# Patient Record
Sex: FEMALE | Race: WHITE | NOT HISPANIC OR LATINO | Employment: UNEMPLOYED | URBAN - METROPOLITAN AREA
[De-identification: names, ages, dates, MRNs, and addresses within clinical notes are randomized per-mention and may not be internally consistent; named-entity substitution may affect disease eponyms.]

---

## 2017-03-22 ENCOUNTER — HOSPITAL ENCOUNTER (OUTPATIENT)
Dept: GENERAL RADIOLOGY | Facility: HOSPITAL | Age: 47
Discharge: HOME | End: 2017-03-22
Attending: FAMILY MEDICINE | Admitting: FAMILY MEDICINE

## 2017-04-12 ENCOUNTER — HOSPITAL ENCOUNTER (OUTPATIENT)
Dept: CARDIOLOGY | Facility: HOSPITAL | Age: 47
Discharge: HOME | End: 2017-04-12
Attending: INTERNAL MEDICINE | Admitting: INTERNAL MEDICINE

## 2017-09-01 ENCOUNTER — OFFICE VISIT (OUTPATIENT)
Dept: CARDIOLOGY | Facility: CLINIC | Age: 47
End: 2017-09-01

## 2017-09-01 VITALS
WEIGHT: 211 LBS | BODY MASS INDEX: 31.25 KG/M2 | HEIGHT: 69 IN | HEART RATE: 73 BPM | SYSTOLIC BLOOD PRESSURE: 106 MMHG | DIASTOLIC BLOOD PRESSURE: 62 MMHG

## 2017-09-01 DIAGNOSIS — Z92.21 HISTORY OF CHEMOTHERAPY: ICD-10-CM

## 2017-09-01 DIAGNOSIS — R07.2 PRECORDIAL PAIN: ICD-10-CM

## 2017-09-01 DIAGNOSIS — I42.9 CARDIOMYOPATHY (HCC): Primary | ICD-10-CM

## 2017-09-01 PROCEDURE — 99204 OFFICE O/P NEW MOD 45 MIN: CPT | Performed by: INTERNAL MEDICINE

## 2017-09-01 PROCEDURE — 93000 ELECTROCARDIOGRAM COMPLETE: CPT | Performed by: INTERNAL MEDICINE

## 2017-09-01 RX ORDER — LANOLIN ALCOHOL/MO/W.PET/CERES
400 CREAM (GRAM) TOPICAL DAILY
COMMUNITY
End: 2021-07-21

## 2017-09-01 NOTE — PROGRESS NOTES
Date of Office Visit: 2017  Encounter Provider: Monty Frank MD  Place of Service: [facility] CARDIOLOGY  Patient Name: Janice Kauffman  :1970    Chief Complaint   Patient presents with   • Chest Pain   :     HPI: Janice Kauffman is a 46 y.o. female who presents today to establish care. She has previously been evaluated by two other cardiologists, once in  and again earlier this year.    In  she was diagnosed with mild nonischemic cardiopathy.  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  Her ejection fraction was initially 40-45%.  She had a treadmill stress test performed in 2013 that was completely normal.    Echo cardiac gram checked in April of this year showed an ejection fraction of 50-55% and was otherwise unremarkable.  A stress test have been recommended that at that time but the patient declined to have this performed.    She has had intermittent chest pain for several years.  She notes it when she wakes up in the morning.  She feels and upper chest pressure and mild shortness breath.  She then sits up and it resolves.  She does not have exertional dyspnea or exertional chest discomfort.  She has rare palpitations which are isolated and occurred at rest.  She does not have orthopnea, PND, edema, lightheadedness, or syncope.  Of note she has ten children.      Past Medical History:   Diagnosis Date   • Asthma     seasonal   • Elevated liver enzymes    • Hodgkin's lymphoma    • Hyperlipidemia    • Mastitis    • Nonischemic cardiomyopathy     unclear if related to chemotherapy or PPCM   • Prediabetes    • UTI (urinary tract infection)     as child       Past Surgical History:   Procedure Laterality Date   •  SECTION      X3   • OTHER SURGICAL HISTORY      PFT   2016   • PORTACATH PLACEMENT         Social History  "    Social History   • Marital status:      Spouse name: N/A   • Number of children: N/A   • Years of education: N/A     Occupational History   • Not on file.     Social History Main Topics   • Smoking status: Never Smoker   • Smokeless tobacco: Never Used   • Alcohol use No      Comment: 2 caffeine drinks per day   • Drug use: No   • Sexual activity: Not on file     Other Topics Concern   • Not on file     Social History Narrative       Family History   Problem Relation Age of Onset   • Heart disease Maternal Grandmother    • Heart disease Other    • Hypertension Mother        Review of Systems   Constitution: Positive for malaise/fatigue.   HENT: Positive for hearing loss.    Eyes: Positive for vision loss in left eye and vision loss in right eye.   Skin: Positive for unusual hair distribution.   Gastrointestinal: Positive for nausea.   All other systems reviewed and are negative.      Allergies   Allergen Reactions   • Latex    • Levaquin [Levofloxacin In D5w]      critical   • Quinolones      mild   • Sulfa Antibiotics      critical         Current Outpatient Prescriptions:   •  B Complex Vitamins (VITAMIN B COMPLEX) tablet, Take 1 tablet by mouth Daily., Disp: , Rfl:   •  Flaxseed, Linseed, (FLAX SEED OIL) 1000 MG capsule, Take 1 capsule by mouth Daily., Disp: , Rfl:   •  folic acid (FOLVITE) 400 MCG tablet, Take 400 mcg by mouth Daily., Disp: , Rfl:   •  Omega-3 Fatty Acids (FISH OIL) 1000 MG capsule capsule, Take 1,000 mg by mouth Daily With Breakfast., Disp: , Rfl:   •  Prenatal Vit-Fe Fumarate-FA (PRENATAL PO), Take 1 tablet by mouth Daily., Disp: , Rfl:   •  pyridoxine (CVS VITAMIN B-6) 100 MG tablet, Take 100 mg by mouth Daily., Disp: , Rfl:      Objective:     Vitals:    09/01/17 1122   BP: 106/62   BP Location: Left arm   Pulse: 73   Weight: 211 lb (95.7 kg)   Height: 68.5\" (174 cm)     Body mass index is 31.62 kg/(m^2).    Physical Exam   Constitutional: She is oriented to person, place, and " time. She appears well-developed and well-nourished.   HENT:   Head: Normocephalic.   Nose: Nose normal.   Mouth/Throat: Oropharynx is clear and moist.   Eyes: Conjunctivae and EOM are normal. Pupils are equal, round, and reactive to light.   Neck: Normal range of motion. No JVD present.   Cardiovascular: Normal rate, regular rhythm and intact distal pulses.    Murmur (soft respirophasic murmur, RUSB) heard.  Pulmonary/Chest: Effort normal and breath sounds normal.   Abdominal: Soft. She exhibits no mass. There is no tenderness.   Musculoskeletal: Normal range of motion. She exhibits no edema.   Lymphadenopathy:     She has no cervical adenopathy.   Neurological: She is alert and oriented to person, place, and time. No cranial nerve deficit.   Skin: Skin is warm and dry. No rash noted.   Psychiatric: She has a normal mood and affect. Her behavior is normal. Judgment and thought content normal.   Vitals reviewed.        ECG 12 Lead  Date/Time: 9/1/2017 1:00 PM  Performed by: WILLI BURCIAGA  Authorized by: WILLI BURCIAGA   Comparison: compared with previous ECG   Similar to previous ECG  Rhythm: sinus rhythm  Conduction: conduction normal  ST Segments: ST segments normal  T Waves: T waves normal  QRS axis: normal  Other: no other findings  Clinical impression: normal ECG              Assessment:       Diagnosis Plan   1. Cardiomyopathy  Adult Stress Echo With Color & Doppler   2. Precordial pain  Adult Stress Echo With Color & Doppler   3. History of chemotherapy  Adult Stress Echo With Color & Doppler          Plan:       In 2013, she was diagnosed with mild LV systolic dysfunction.  This may have been postpartum cardio myopathy, or it may have been related to her previous chemotherapy.  Her initial echo showed an EF of 40-45%, and it had improved to 53% in 2017.  It was otherwise normal.  She states that she was on some type of cardiac medication for short while but that it gave her bad side effects so she elected not  to take it.  She does not have exertional symptoms, and I suspect that the symptoms that she has in the morning while lying flat or noncardiac.  That being said they've been happening for a very long time and it is very worrisome to her.  I am going to check a stress echocardiogram for further evaluation.  If that's normal, I would recommend an esophageal evaluation.    Her increased with palpitations sound like benign ectopics.  I don't feel the need further workup at this time.    Sincerely,       Monty Frank MD

## 2017-09-11 ENCOUNTER — HOSPITAL ENCOUNTER (OUTPATIENT)
Dept: CARDIOLOGY | Facility: HOSPITAL | Age: 47
Discharge: HOME | End: 2017-09-11
Attending: INTERNAL MEDICINE | Admitting: INTERNAL MEDICINE

## 2017-09-11 VITALS
HEIGHT: 69 IN | SYSTOLIC BLOOD PRESSURE: 124 MMHG | DIASTOLIC BLOOD PRESSURE: 76 MMHG | BODY MASS INDEX: 31.25 KG/M2 | WEIGHT: 211 LBS | HEART RATE: 76 BPM

## 2017-09-11 DIAGNOSIS — I42.9 CARDIOMYOPATHY (HCC): ICD-10-CM

## 2017-09-11 DIAGNOSIS — R07.2 PRECORDIAL PAIN: ICD-10-CM

## 2017-09-11 DIAGNOSIS — Z92.21 HISTORY OF CHEMOTHERAPY: ICD-10-CM

## 2017-09-11 LAB
BH CV ECHO MEAS - ACS: 1.7 CM
BH CV ECHO MEAS - AO MAX PG: 10.3 MMHG
BH CV ECHO MEAS - AO ROOT AREA (BSA CORRECTED): 1.3
BH CV ECHO MEAS - AO ROOT AREA: 5.7 CM^2
BH CV ECHO MEAS - AO ROOT DIAM: 2.7 CM
BH CV ECHO MEAS - AO V2 MAX: 160.2 CM/SEC
BH CV ECHO MEAS - BSA(HAYCOCK): 2.2 M^2
BH CV ECHO MEAS - BSA: 2.1 M^2
BH CV ECHO MEAS - BZI_BMI: 31.2 KILOGRAMS/M^2
BH CV ECHO MEAS - BZI_METRIC_HEIGHT: 175.3 CM
BH CV ECHO MEAS - BZI_METRIC_WEIGHT: 95.7 KG
BH CV ECHO MEAS - CONTRAST EF (2CH): 58.2 ML/M^2
BH CV ECHO MEAS - CONTRAST EF 4CH: 52 ML/M^2
BH CV ECHO MEAS - EDV(MOD-SP2): 79 ML
BH CV ECHO MEAS - EDV(MOD-SP4): 75 ML
BH CV ECHO MEAS - EDV(TEICH): 118.5 ML
BH CV ECHO MEAS - EF(CUBED): 60.8 %
BH CV ECHO MEAS - EF(MOD-SP2): 58.2 %
BH CV ECHO MEAS - EF(MOD-SP4): 52 %
BH CV ECHO MEAS - EF(TEICH): 52.1 %
BH CV ECHO MEAS - ESV(MOD-SP2): 33 ML
BH CV ECHO MEAS - ESV(MOD-SP4): 36 ML
BH CV ECHO MEAS - ESV(TEICH): 56.7 ML
BH CV ECHO MEAS - FS: 26.8 %
BH CV ECHO MEAS - IVS/LVPW: 1
BH CV ECHO MEAS - IVSD: 1 CM
BH CV ECHO MEAS - LAT PEAK E' VEL: 13 CM/SEC
BH CV ECHO MEAS - LV DIASTOLIC VOL/BSA (35-75): 35.5 ML/M^2
BH CV ECHO MEAS - LV MASS(C)D: 186.4 GRAMS
BH CV ECHO MEAS - LV MASS(C)DI: 88.2 GRAMS/M^2
BH CV ECHO MEAS - LV SYSTOLIC VOL/BSA (12-30): 17 ML/M^2
BH CV ECHO MEAS - LVIDD: 5 CM
BH CV ECHO MEAS - LVIDS: 3.7 CM
BH CV ECHO MEAS - LVLD AP2: 7.5 CM
BH CV ECHO MEAS - LVLD AP4: 7.3 CM
BH CV ECHO MEAS - LVLS AP2: 6.3 CM
BH CV ECHO MEAS - LVLS AP4: 6.4 CM
BH CV ECHO MEAS - LVPWD: 1 CM
BH CV ECHO MEAS - MED PEAK E' VEL: 12 CM/SEC
BH CV ECHO MEAS - MV A DUR: 0.11 SEC
BH CV ECHO MEAS - MV A MAX VEL: 66 CM/SEC
BH CV ECHO MEAS - MV DEC SLOPE: 338.1 CM/SEC^2
BH CV ECHO MEAS - MV DEC TIME: 0.24 SEC
BH CV ECHO MEAS - MV E MAX VEL: 80.5 CM/SEC
BH CV ECHO MEAS - MV E/A: 1.2
BH CV ECHO MEAS - MV P1/2T MAX VEL: 81 CM/SEC
BH CV ECHO MEAS - MV P1/2T: 70.2 MSEC
BH CV ECHO MEAS - MVA P1/2T LCG: 2.7 CM^2
BH CV ECHO MEAS - MVA(P1/2T): 3.1 CM^2
BH CV ECHO MEAS - PULM A REVS DUR: 0.11 SEC
BH CV ECHO MEAS - PULM A REVS VEL: 32 CM/SEC
BH CV ECHO MEAS - PULM DIAS VEL: 31.5 CM/SEC
BH CV ECHO MEAS - PULM S/D: 1.4
BH CV ECHO MEAS - PULM SYS VEL: 44.1 CM/SEC
BH CV ECHO MEAS - SI(CUBED): 36.1 ML/M^2
BH CV ECHO MEAS - SI(MOD-SP2): 21.8 ML/M^2
BH CV ECHO MEAS - SI(MOD-SP4): 18.5 ML/M^2
BH CV ECHO MEAS - SI(TEICH): 29.2 ML/M^2
BH CV ECHO MEAS - SV(CUBED): 76.2 ML
BH CV ECHO MEAS - SV(MOD-SP2): 46 ML
BH CV ECHO MEAS - SV(MOD-SP4): 39 ML
BH CV ECHO MEAS - SV(TEICH): 61.8 ML
BH CV ECHO MEAS - TAPSE (>1.6): 1.9 CM2
BH CV ECHO MEAS - TR MAX VEL: 192.7 CM/SEC
BH CV STRESS BP STAGE 1: NORMAL
BH CV STRESS BP STAGE 2: NORMAL
BH CV STRESS BP STAGE 3: NORMAL
BH CV STRESS DURATION MIN STAGE 1: 3
BH CV STRESS DURATION MIN STAGE 2: 3
BH CV STRESS DURATION MIN STAGE 3: 3
BH CV STRESS DURATION MIN STAGE 4: 1
BH CV STRESS DURATION SEC STAGE 1: 0
BH CV STRESS DURATION SEC STAGE 2: 0
BH CV STRESS DURATION SEC STAGE 3: 0
BH CV STRESS DURATION SEC STAGE 4: 30
BH CV STRESS ECHO POST STRESS EJECTION FRACTION EF: 74 %
BH CV STRESS GRADE STAGE 1: 10
BH CV STRESS GRADE STAGE 2: 12
BH CV STRESS GRADE STAGE 3: 14
BH CV STRESS GRADE STAGE 4: 16
BH CV STRESS HR STAGE 1: 104
BH CV STRESS HR STAGE 2: 119
BH CV STRESS HR STAGE 3: 136
BH CV STRESS HR STAGE 4: 158
BH CV STRESS METS STAGE 1: 5
BH CV STRESS METS STAGE 2: 7.5
BH CV STRESS METS STAGE 3: 10
BH CV STRESS METS STAGE 4: 13.5
BH CV STRESS PROTOCOL 1: NORMAL
BH CV STRESS SPEED STAGE 1: 1.7
BH CV STRESS SPEED STAGE 2: 2.5
BH CV STRESS SPEED STAGE 3: 3.4
BH CV STRESS SPEED STAGE 4: 4.2
BH CV STRESS STAGE 1: 1
BH CV STRESS STAGE 2: 2
BH CV STRESS STAGE 3: 3
BH CV STRESS STAGE 4: 4
BH CV XLRA - RV BASE: 2.7 CM
BH CV XLRA - TDI S': 13 CM/SEC
E/E' RATIO: 6.5
LEFT ATRIUM VOLUME INDEX: 12 ML/M2
MAXIMAL PREDICTED HEART RATE: 175 BPM
PERCENT MAX PREDICTED HR: 90.29 %
STRESS BASELINE BP: NORMAL MMHG
STRESS BASELINE HR: 76 BPM
STRESS PERCENT HR: 106 %
STRESS POST ESTIMATED WORKLOAD: 12 METS
STRESS POST EXERCISE DUR MIN: 10 MIN
STRESS POST EXERCISE DUR SEC: 30 SEC
STRESS POST PEAK BP: NORMAL MMHG
STRESS POST PEAK HR: 158 BPM
STRESS TARGET HR: 149 BPM

## 2017-09-11 PROCEDURE — 93017 CV STRESS TEST TRACING ONLY: CPT

## 2017-09-11 PROCEDURE — 93350 STRESS TTE ONLY: CPT

## 2017-09-11 PROCEDURE — 93325 DOPPLER ECHO COLOR FLOW MAPG: CPT | Performed by: INTERNAL MEDICINE

## 2017-09-11 PROCEDURE — 93016 CV STRESS TEST SUPVJ ONLY: CPT | Performed by: INTERNAL MEDICINE

## 2017-09-11 PROCEDURE — 93018 CV STRESS TEST I&R ONLY: CPT | Performed by: INTERNAL MEDICINE

## 2017-09-11 PROCEDURE — 93320 DOPPLER ECHO COMPLETE: CPT | Performed by: INTERNAL MEDICINE

## 2017-09-11 PROCEDURE — 93320 DOPPLER ECHO COMPLETE: CPT

## 2017-09-11 PROCEDURE — 93350 STRESS TTE ONLY: CPT | Performed by: INTERNAL MEDICINE

## 2017-09-11 PROCEDURE — 93325 DOPPLER ECHO COLOR FLOW MAPG: CPT

## 2017-10-11 ENCOUNTER — HOSPITAL ENCOUNTER (OUTPATIENT)
Dept: ONCOLOGY | Facility: CLINIC | Age: 47
Setting detail: INFUSION SERIES
Discharge: HOME | End: 2017-10-11
Attending: INTERNAL MEDICINE | Admitting: INTERNAL MEDICINE

## 2017-10-11 ENCOUNTER — CLINICAL SUPPORT (OUTPATIENT)
Dept: ONCOLOGY | Facility: HOSPITAL | Age: 47
End: 2017-10-11

## 2017-10-11 ENCOUNTER — HOSPITAL ENCOUNTER (OUTPATIENT)
Dept: ONCOLOGY | Facility: HOSPITAL | Age: 47
Discharge: HOME | End: 2017-10-11
Attending: INTERNAL MEDICINE | Admitting: INTERNAL MEDICINE

## 2017-10-11 LAB
ALBUMIN SERPL-MCNC: 4.6 G/DL (ref 3.5–4.8)
ALBUMIN/GLOB SERPL: 1.4 {RATIO} (ref 1–1.7)
ALP SERPL-CCNC: 47 IU/L (ref 32–91)
ALT SERPL-CCNC: 37 IU/L (ref 14–54)
ANION GAP SERPL CALC-SCNC: 10.7 MMOL/L (ref 10–20)
AST SERPL-CCNC: 29 IU/L (ref 15–41)
BILIRUB SERPL-MCNC: 0.2 MG/DL (ref 0.3–1.2)
BUN SERPL-MCNC: 18 MG/DL (ref 8–20)
BUN/CREAT SERPL: 20 (ref 5.4–26.2)
CALCIUM SERPL-MCNC: 9.7 MG/DL (ref 8.9–10.3)
CHLORIDE SERPL-SCNC: 104 MMOL/L (ref 101–111)
CONV CO2: 27 MMOL/L (ref 22–32)
CONV TOTAL PROTEIN: 7.9 G/DL (ref 6.1–7.9)
CREAT UR-MCNC: 0.9 MG/DL (ref 0.4–1)
GLOBULIN UR ELPH-MCNC: 3.3 G/DL (ref 2.5–3.8)
GLUCOSE SERPL-MCNC: 85 MG/DL (ref 65–99)
POTASSIUM SERPL-SCNC: 3.7 MMOL/L (ref 3.6–5.1)
SODIUM SERPL-SCNC: 138 MMOL/L (ref 136–144)

## 2017-10-11 NOTE — PROGRESS NOTES
PATIENTS ONCOLOGY RECORD LOCATED IN [facility]      Subjective     Name:  BECKY CASTREJON CLYDE     Date:  10/11/2017  Address:  [address] [place] [address]  Home: [phone]  :  1970 AGE:  46 y.o.        RECORDS OBTAINED:  Patients Oncology Record is located in [facility]

## 2017-12-05 ENCOUNTER — HOSPITAL ENCOUNTER (OUTPATIENT)
Dept: LAB | Facility: HOSPITAL | Age: 47
Discharge: HOME | End: 2017-12-05
Attending: FAMILY MEDICINE | Admitting: FAMILY MEDICINE

## 2017-12-05 LAB — PROGEST SERPL-MCNC: NORMAL NG/ML

## 2017-12-06 ENCOUNTER — HOSPITAL ENCOUNTER (OUTPATIENT)
Dept: LAB | Facility: HOSPITAL | Age: 47
Discharge: HOME | End: 2017-12-06
Attending: FAMILY MEDICINE | Admitting: FAMILY MEDICINE

## 2018-10-18 ENCOUNTER — HOSPITAL ENCOUNTER (OUTPATIENT)
Dept: ONCOLOGY | Facility: HOSPITAL | Age: 48
Setting detail: OBSERVATION
Discharge: HOME | End: 2018-10-18
Attending: INTERNAL MEDICINE | Admitting: INTERNAL MEDICINE

## 2018-10-18 ENCOUNTER — HOSPITAL ENCOUNTER (OUTPATIENT)
Dept: ONCOLOGY | Facility: CLINIC | Age: 48
Setting detail: INFUSION SERIES
Discharge: HOME | End: 2018-10-18
Attending: INTERNAL MEDICINE | Admitting: INTERNAL MEDICINE

## 2018-10-18 ENCOUNTER — CLINICAL SUPPORT (OUTPATIENT)
Dept: ONCOLOGY | Facility: HOSPITAL | Age: 48
End: 2018-10-18

## 2018-10-18 LAB
ALBUMIN SERPL-MCNC: 4.3 G/DL (ref 3.5–4.8)
ALBUMIN/GLOB SERPL: 1.2 {RATIO} (ref 1–1.7)
ALP SERPL-CCNC: 46 IU/L (ref 32–91)
ALT SERPL-CCNC: 40 IU/L (ref 14–54)
ANION GAP SERPL CALC-SCNC: 13.9 MMOL/L (ref 10–20)
AST SERPL-CCNC: 26 IU/L (ref 15–41)
BILIRUB SERPL-MCNC: 0.4 MG/DL (ref 0.3–1.2)
BUN SERPL-MCNC: 14 MG/DL (ref 8–20)
BUN/CREAT SERPL: 15.6 (ref 5.4–26.2)
CALCIUM SERPL-MCNC: 9.3 MG/DL (ref 8.9–10.3)
CHLORIDE SERPL-SCNC: 101 MMOL/L (ref 101–111)
CONV CO2: 27 MMOL/L (ref 22–32)
CONV TOTAL PROTEIN: 7.8 G/DL (ref 6.1–7.9)
CREAT UR-MCNC: 0.9 MG/DL (ref 0.4–1)
GLOBULIN UR ELPH-MCNC: 3.5 G/DL (ref 2.5–3.8)
GLUCOSE SERPL-MCNC: 90 MG/DL (ref 65–99)
POTASSIUM SERPL-SCNC: 3.9 MMOL/L (ref 3.6–5.1)
SODIUM SERPL-SCNC: 138 MMOL/L (ref 136–144)
T3FREE SERPL-MCNC: 3.33 PG/ML (ref 2.39–6.79)
T4 FREE SERPL-MCNC: 0.82 NG/DL (ref 0.58–1.64)
TSH SERPL-ACNC: 1.98 UIU/ML (ref 0.34–5.6)

## 2018-10-18 NOTE — PROGRESS NOTES
PATIENTS ONCOLOGY RECORD LOCATED IN [facility]      Subjective     Name:  BECKY CASTREJON CLYDE     Date:  10/18/2018  Address:  [address] [place] [address]  Home: [phone]  :  1970 AGE:  47 y.o.        RECORDS OBTAINED:  Patients Oncology Record is located in [facility]

## 2018-10-30 ENCOUNTER — HOSPITAL ENCOUNTER (OUTPATIENT)
Dept: MAMMOGRAPHY | Facility: HOSPITAL | Age: 48
Discharge: HOME | End: 2018-10-30
Attending: INTERNAL MEDICINE | Admitting: INTERNAL MEDICINE

## 2018-12-12 ENCOUNTER — HOSPITAL ENCOUNTER (OUTPATIENT)
Dept: LAB | Facility: HOSPITAL | Age: 48
Discharge: HOME | End: 2018-12-12
Attending: STUDENT IN AN ORGANIZED HEALTH CARE EDUCATION/TRAINING PROGRAM | Admitting: STUDENT IN AN ORGANIZED HEALTH CARE EDUCATION/TRAINING PROGRAM

## 2018-12-12 LAB — PROGEST SERPL-MCNC: 2.2 NG/ML

## 2018-12-14 ENCOUNTER — HOSPITAL ENCOUNTER (OUTPATIENT)
Dept: LAB | Facility: HOSPITAL | Age: 48
Setting detail: SPECIMEN
Discharge: HOME | End: 2018-12-14
Attending: STUDENT IN AN ORGANIZED HEALTH CARE EDUCATION/TRAINING PROGRAM | Admitting: STUDENT IN AN ORGANIZED HEALTH CARE EDUCATION/TRAINING PROGRAM

## 2018-12-16 ENCOUNTER — HOSPITAL ENCOUNTER (OUTPATIENT)
Dept: LAB | Facility: HOSPITAL | Age: 48
Discharge: HOME | End: 2018-12-16
Attending: STUDENT IN AN ORGANIZED HEALTH CARE EDUCATION/TRAINING PROGRAM | Admitting: STUDENT IN AN ORGANIZED HEALTH CARE EDUCATION/TRAINING PROGRAM

## 2018-12-16 LAB
FSH SERPL-ACNC: 4.2 MIU/ML
LH SERPL-ACNC: 3.3 MIU/ML
PROGEST SERPL-MCNC: 7.5 NG/ML
PROLACTIN SERPL-MCNC: 3.4 NG/ML (ref 3.3–27)
T3FREE SERPL-MCNC: 4.23 PG/ML (ref 2.39–6.79)
T4 FREE SERPL-MCNC: 0.8 NG/DL (ref 0.58–1.64)
T4 SERPL-MCNC: 6.76 UG/DL (ref 6.1–12.2)
TSH SERPL-ACNC: 1.55 UIU/ML (ref 0.34–5.6)

## 2018-12-18 ENCOUNTER — HOSPITAL ENCOUNTER (OUTPATIENT)
Dept: LAB | Facility: HOSPITAL | Age: 48
Setting detail: SPECIMEN
Discharge: HOME | End: 2018-12-18
Attending: STUDENT IN AN ORGANIZED HEALTH CARE EDUCATION/TRAINING PROGRAM | Admitting: STUDENT IN AN ORGANIZED HEALTH CARE EDUCATION/TRAINING PROGRAM

## 2018-12-20 ENCOUNTER — HOSPITAL ENCOUNTER (OUTPATIENT)
Dept: LAB | Facility: HOSPITAL | Age: 48
Setting detail: SPECIMEN
Discharge: HOME | End: 2018-12-20
Attending: OBSTETRICS & GYNECOLOGY | Admitting: OBSTETRICS & GYNECOLOGY

## 2018-12-20 LAB — ANDROST SERPL-MCNC: 74 NG/DL (ref 30–200)

## 2018-12-21 LAB
17OHP SERPL-MCNC: 108 NG/DL
DHEA-S SERPL-MCNC: 264 MCG/DL (ref 19–231)
TESTOST FREE SERPL-MCNC: 1.8 PG/ML (ref 0.1–6.4)
TESTOST SERPL-MCNC: 18 NG/DL (ref 2–45)

## 2019-10-16 ENCOUNTER — TELEPHONE (OUTPATIENT)
Dept: ONCOLOGY | Facility: CLINIC | Age: 49
End: 2019-10-16

## 2019-10-16 NOTE — TELEPHONE ENCOUNTER
Ms. Kauffman left message to cancel her appt for 10/17.  She has found another provider and was not happy about this practice.  Returned call, left message confirming cancellation of her appt.

## 2019-10-17 ENCOUNTER — APPOINTMENT (OUTPATIENT)
Dept: ONCOLOGY | Facility: CLINIC | Age: 49
End: 2019-10-17

## 2019-10-17 ENCOUNTER — APPOINTMENT (OUTPATIENT)
Dept: LAB | Facility: HOSPITAL | Age: 49
End: 2019-10-17

## 2020-07-17 ENCOUNTER — TELEPHONE (OUTPATIENT)
Dept: ONCOLOGY | Facility: CLINIC | Age: 50
End: 2020-07-17

## 2020-07-17 NOTE — TELEPHONE ENCOUNTER
Patient is a Dr. Carlton patient who would like to come back to the office to see Dr. Danielle.  Dr. Danielle has seen her in the past.      [phone]

## 2021-03-02 ENCOUNTER — TRANSCRIBE ORDERS (OUTPATIENT)
Dept: ADMINISTRATIVE | Facility: HOSPITAL | Age: 51
End: 2021-03-02

## 2021-03-02 DIAGNOSIS — E04.9 ENLARGEMENT OF THYROID: Primary | ICD-10-CM

## 2021-03-08 ENCOUNTER — HOSPITAL ENCOUNTER (OUTPATIENT)
Dept: ULTRASOUND IMAGING | Facility: HOSPITAL | Age: 51
Discharge: HOME | End: 2021-03-08
Admitting: NURSE PRACTITIONER

## 2021-03-08 DIAGNOSIS — E04.9 ENLARGEMENT OF THYROID: ICD-10-CM

## 2021-03-08 PROCEDURE — 76536 US EXAM OF HEAD AND NECK: CPT

## 2021-07-21 ENCOUNTER — OFFICE VISIT (OUTPATIENT)
Dept: SURGERY | Facility: CLINIC | Age: 51
End: 2021-07-21

## 2021-07-21 VITALS
BODY MASS INDEX: 34.8 KG/M2 | HEART RATE: 79 BPM | OXYGEN SATURATION: 97 % | SYSTOLIC BLOOD PRESSURE: 135 MMHG | DIASTOLIC BLOOD PRESSURE: 78 MMHG | HEIGHT: 69 IN | RESPIRATION RATE: 18 BRPM | TEMPERATURE: 98.2 F | WEIGHT: 235 LBS

## 2021-07-21 DIAGNOSIS — K80.20 GALLSTONES: Primary | ICD-10-CM

## 2021-07-21 DIAGNOSIS — G89.29 CHRONIC RIGHT-SIDED THORACIC BACK PAIN: ICD-10-CM

## 2021-07-21 DIAGNOSIS — M54.6 CHRONIC RIGHT-SIDED THORACIC BACK PAIN: ICD-10-CM

## 2021-07-21 PROCEDURE — 99204 OFFICE O/P NEW MOD 45 MIN: CPT | Performed by: SURGERY

## 2021-07-21 RX ORDER — DIPHENOXYLATE HYDROCHLORIDE AND ATROPINE SULFATE 2.5; .025 MG/1; MG/1
TABLET ORAL DAILY
COMMUNITY

## 2021-07-21 NOTE — PROGRESS NOTES
GENERAL SURGERY CONSULTATION NOTE    Consult requested by: Dr. Ramírez    Patient Care Team:  Ramana Fields MD as PCP - General (Family Medicine)    Reason for consult: back pain, gallstones    Subjective     Patient is a 50 y.o. female presents with a history of back pain which has been going on for the past few months.  She reports that she has had back pain in the past and was told by other physicians that they felt that this was due primarily to musculoskeletal discomfort.  She reports that her pain in her back is constant, and seems to be worse when she is fatigued.  She has not noticed that she has had any significant nausea, vomiting, diarrhea, or fevers.  She has not noticed any yellowing of the skin or eyes.  A CT scan of the abdomen and pelvis which was performed on 6/25/2021 at [facility] demonstrated hepatic steatosis and cholelithiasis without definite findings of cholecystitis.  The patient has had 9 pregnancies and multiple C-sections.  She has a history of stage IIb nodular sclerosing Hodgkin's lymphoma and has no evidence of active disease at this time or recurrence.     Review of Systems   Constitutional: Positive for fatigue. Negative for appetite change, chills and fever.   HENT: Positive for ear discharge, ear pain and hearing loss. Negative for congestion and sore throat.    Respiratory: Positive for apnea. Negative for cough and shortness of breath.    Cardiovascular: Negative for chest pain and palpitations.   Gastrointestinal: Positive for abdominal distention, blood in stool and nausea. Negative for abdominal pain, constipation, diarrhea, vomiting and GERD.   Endocrine: Positive for polyphagia.   Genitourinary: Negative for difficulty urinating, dysuria and frequency.   Musculoskeletal: Negative for arthralgias and back pain.   Skin: Negative for rash and skin lesions.   Neurological: Positive for dizziness. Negative for seizures and memory problem.   Hematological: Negative  for adenopathy. Does not bruise/bleed easily.   Psychiatric/Behavioral: Positive for sleep disturbance. Negative for depressed mood.        History  Past Medical History:   Diagnosis Date   • Asthma     seasonal   • Elevated liver enzymes    • Hodgkin's lymphoma (CMS/HCC)    • Hyperlipidemia    • Mastitis    • Nonischemic cardiomyopathy (CMS/HCC)     unclear if related to chemotherapy or PPCM   • Prediabetes    • UTI (urinary tract infection)     as child     Past Surgical History:   Procedure Laterality Date   •  SECTION      X3   • OTHER SURGICAL HISTORY      PFT   2016   • PORTACATH PLACEMENT       Family History   Problem Relation Age of Onset   • Heart disease Maternal Grandmother    • Heart disease Other    • Hypertension Mother    • Thyroid cancer Mother    • Stroke Mother    • No Known Problems Father      Social History     Tobacco Use   • Smoking status: Never Smoker   • Smokeless tobacco: Never Used   Substance Use Topics   • Alcohol use: No     Comment: 2 caffeine drinks per day   • Drug use: No     (Not in a hospital admission)    Allergies:  Latex, Levaquin [levofloxacin in d5w], Quinolones, and Sulfa antibiotics    Objective     Vital Signs  Temp:  [98.2 °F (36.8 °C)] 98.2 °F (36.8 °C)  Heart Rate:  [79] 79  Resp:  [18] 18  BP: (135)/(78) 135/78    Physical Exam  Vitals reviewed.   Constitutional:       Appearance: She is well-developed.   HENT:      Head: Normocephalic and atraumatic.   Eyes:      Pupils: Pupils are equal, round, and reactive to light.   Cardiovascular:      Rate and Rhythm: Normal rate and regular rhythm.   Pulmonary:      Effort: Pulmonary effort is normal.      Breath sounds: Normal breath sounds.   Abdominal:      General: There is no distension.      Palpations: Abdomen is soft.      Tenderness: There is abdominal tenderness in the right upper quadrant. There is no guarding or rebound. Negative signs include Moreno's sign.      Hernia: No hernia is present.    Musculoskeletal:         General: Normal range of motion.      Cervical back: Normal range of motion.   Lymphadenopathy:      Cervical: No cervical adenopathy.   Skin:     General: Skin is warm and dry.      Findings: No rash.   Neurological:      Mental Status: She is alert and oriented to person, place, and time.         Results Review:   Lab Results (last 24 hours)     ** No results found for the last 24 hours. **        No radiology results for the last day      I reviewed the patient's new imaging results and agree with the interpretation.  I reviewed the patient's other test results and agree with the interpretation    Assessment/Plan     Active Problems:  Gallstones  Back pain    It is difficult for me to determine as to whether or not the patient has back pain due to her gallstones.  Her symptoms are not classic for biliary colic.  We discussed this in great detail.  We discussed the natural history of gallstones, biliary colic, and the role for surgical intervention to remove the gallbladder in order to prevent these in the future.  In listening to her description of her back pain, I wonder if this is not musculoskeletal or as previously thought.  The patient is planning to leave the country to go to the [facility] to perform adoption of a child in the next few weeks.  My recommendation for her would be to obtain an ultrasound of the gallbladder, and a plain film x-ray of the spine.  I also think that she would benefit from some physical therapy.  I will see her about a month after she returns from the [facility], and at that time if her pain is no better, we can consider laparoscopic cholecystectomy for removal of her gallbladder.  I did offer her the option of proceeding to the operating room prior to her flight to the [facility] for laparoscopic cholecystectomy in order to reduce her risk of acute cholecystitis while overseas.  The patient declined.  We discussed the symptoms of acute cholecystitis, and what to  watch out for and I instructed her that if she develops any of these she should present to the emergency room as soon as possible for further evaluation.    I discussed the patients findings and my recommendations with the patient.     Pk Martinez MD  07/21/21  15:27 EDT

## 2021-07-28 ENCOUNTER — HOSPITAL ENCOUNTER (OUTPATIENT)
Dept: ULTRASOUND IMAGING | Facility: HOSPITAL | Age: 51
Discharge: HOME | End: 2021-07-28

## 2021-07-28 ENCOUNTER — HOSPITAL ENCOUNTER (OUTPATIENT)
Dept: GENERAL RADIOLOGY | Facility: HOSPITAL | Age: 51
Discharge: HOME | End: 2021-07-28

## 2021-07-28 DIAGNOSIS — G89.29 CHRONIC RIGHT-SIDED THORACIC BACK PAIN: ICD-10-CM

## 2021-07-28 DIAGNOSIS — M54.6 CHRONIC RIGHT-SIDED THORACIC BACK PAIN: ICD-10-CM

## 2021-07-28 DIAGNOSIS — K80.20 GALLSTONES: ICD-10-CM

## 2021-07-28 PROCEDURE — 72100 X-RAY EXAM L-S SPINE 2/3 VWS: CPT

## 2021-07-28 PROCEDURE — 76705 ECHO EXAM OF ABDOMEN: CPT

## 2021-07-28 PROCEDURE — 72070 X-RAY EXAM THORAC SPINE 2VWS: CPT

## 2021-09-08 ENCOUNTER — OFFICE VISIT (OUTPATIENT)
Dept: SURGERY | Facility: CLINIC | Age: 51
End: 2021-09-08

## 2021-09-08 VITALS
DIASTOLIC BLOOD PRESSURE: 81 MMHG | OXYGEN SATURATION: 96 % | TEMPERATURE: 97.1 F | HEIGHT: 69 IN | WEIGHT: 232 LBS | HEART RATE: 67 BPM | SYSTOLIC BLOOD PRESSURE: 147 MMHG | BODY MASS INDEX: 34.36 KG/M2

## 2021-09-08 DIAGNOSIS — R11.2 NON-INTRACTABLE VOMITING WITH NAUSEA, UNSPECIFIED VOMITING TYPE: ICD-10-CM

## 2021-09-08 DIAGNOSIS — K80.20 GALLSTONES: Primary | ICD-10-CM

## 2021-09-08 PROCEDURE — 99213 OFFICE O/P EST LOW 20 MIN: CPT | Performed by: SURGERY

## 2021-09-08 NOTE — PROGRESS NOTES
GENERAL SURGERY CONSULTATION NOTE    Consult requested by: Dr. Ramírez    Patient Care Team:  Ramana Fields MD as PCP - General (Family Medicine)    Reason for consult: back pain, gallstones    Subjective   From 7/21/2021:  Patient is a 50 y.o. female presents with a history of back pain which has been going on for the past few months.  She reports that she has had back pain in the past and was told by other physicians that they felt that this was due primarily to musculoskeletal discomfort.  She reports that her pain in her back is constant, and seems to be worse when she is fatigued.  She has not noticed that she has had any significant nausea, vomiting, diarrhea, or fevers.  She has not noticed any yellowing of the skin or eyes.  A CT scan of the abdomen and pelvis which was performed on 6/25/2021 at [facility] demonstrated hepatic steatosis and cholelithiasis without definite findings of cholecystitis.  The patient has had 9 pregnancies and multiple C-sections.  She has a history of stage IIb nodular sclerosing Hodgkin's lymphoma and has no evidence of active disease at this time or recurrence.     From 9/8/2021:  Patient reports no significant change from the last time I saw her.  She was overseas as we discussed previously pending the adoption of a child.  An ultrasound of the abdomen which was performed at [facility] on 7/28/2021 did not demonstrate any significant changes within the gallbladder.  There was no cholelithiasis identified, no evidence for any cholecystitis.  Patient was noted to have some hepatomegaly with hepatic steatosis.  This stands in opposition of what was seen on the patient's outside hospital CT scan of the abdomen and pelvis which demonstrated gallstones.  The patient was also noted to have some minor lumbar spondylolysis and mild degenerative changes of the thoracic spine specifically on the left side.    Review of Systems   Constitutional: Positive for  fatigue. Negative for appetite change, chills and fever.   HENT: Positive for ear discharge, ear pain and hearing loss. Negative for congestion and sore throat.    Respiratory: Positive for apnea. Negative for cough and shortness of breath.    Cardiovascular: Negative for chest pain and palpitations.   Gastrointestinal: Positive for abdominal distention, blood in stool and nausea. Negative for abdominal pain, constipation, diarrhea, vomiting and GERD.   Endocrine: Positive for polyphagia.   Genitourinary: Negative for difficulty urinating, dysuria and frequency.   Musculoskeletal: Negative for arthralgias and back pain.   Skin: Negative for rash and skin lesions.   Neurological: Positive for dizziness. Negative for seizures and memory problem.   Hematological: Negative for adenopathy. Does not bruise/bleed easily.   Psychiatric/Behavioral: Positive for sleep disturbance. Negative for depressed mood.        History  Past Medical History:   Diagnosis Date   • Asthma     seasonal   • Elevated liver enzymes    • Hodgkin's lymphoma (CMS/HCC)    • Hyperlipidemia    • Mastitis    • Nonischemic cardiomyopathy (CMS/HCC)     unclear if related to chemotherapy or PPCM   • Prediabetes    • UTI (urinary tract infection)     as child     Past Surgical History:   Procedure Laterality Date   •  SECTION      X3   • OTHER SURGICAL HISTORY      PFT   2016   • PORTACATH PLACEMENT       Family History   Problem Relation Age of Onset   • Heart disease Maternal Grandmother    • Heart disease Other    • Hypertension Mother    • Thyroid cancer Mother    • Stroke Mother    • No Known Problems Father      Social History     Tobacco Use   • Smoking status: Never Smoker   • Smokeless tobacco: Never Used   Substance Use Topics   • Alcohol use: No     Comment: 2 caffeine drinks per day   • Drug use: No     (Not in a hospital admission)    Allergies:  Latex, Levaquin [levofloxacin in d5w], Quinolones, and Sulfa antibiotics    Objective      Vital Signs  Temp:  [97.1 °F (36.2 °C)] 97.1 °F (36.2 °C)  Heart Rate:  [67] 67  BP: (147)/(81) 147/81    Physical Exam  Vitals reviewed.   Constitutional:       Appearance: She is well-developed.   HENT:      Head: Normocephalic and atraumatic.   Eyes:      Pupils: Pupils are equal, round, and reactive to light.   Cardiovascular:      Rate and Rhythm: Normal rate and regular rhythm.   Pulmonary:      Effort: Pulmonary effort is normal.      Breath sounds: Normal breath sounds.   Abdominal:      General: There is no distension.      Palpations: Abdomen is soft.      Tenderness: There is abdominal tenderness in the right upper quadrant. There is no guarding or rebound. Negative signs include Moreno's sign.      Hernia: No hernia is present.   Musculoskeletal:         General: Normal range of motion.      Cervical back: Normal range of motion.   Lymphadenopathy:      Cervical: No cervical adenopathy.   Skin:     General: Skin is warm and dry.      Findings: No rash.   Neurological:      Mental Status: She is alert and oriented to person, place, and time.         Results Review:   Lab Results (last 24 hours)     ** No results found for the last 24 hours. **        No radiology results for the last day      I reviewed the patient's new imaging results and agree with the interpretation.  I reviewed the patient's other test results and agree with the interpretation    Assessment/Plan     Active Problems:  Right-sided abdominal pain  Back pain    Ultrasound performed at [facility] did not demonstrate any gallstones, however the CT scan from the outlying hospital did.  I would recommend obtaining a HIDA scan at this time to ensure proper functioning of the gallbladder.  If her gallbladder is functional, and there are no evidence of any stones, then the gallbladder is not likely the source of her symptoms.  It would also be worthwhile to consider an EGD to look for evidence of gastritis and/or peptic ulcer  disease as a potential source for her persistent right upper quadrant abdominal pain.  I did offer to remove the patient's gallbladder as there was evidence at the outside hospital that she did have gallstones, and that certainly could be the source of her right-sided abdominal pain.  However, the patient does not want to proceed to surgery if there is still question as to whether or not the gallbladder is the etiology of her symptoms.  We will obtain a HIDA scan and I will refer the patient to GI for possible EGD.  I will follow back up with the patient once these tests have been done.  Again she will be traveling overseas to the [facility] at least once if not 2 more times this fall while going to the process of adopting her child.    I discussed the patients findings and my recommendations with the patient.     Pk Martinez MD  09/08/21  14:09 EDT

## 2021-09-20 ENCOUNTER — HOSPITAL ENCOUNTER (OUTPATIENT)
Dept: NUCLEAR MEDICINE | Facility: HOSPITAL | Age: 51
Discharge: HOME | End: 2021-09-20

## 2021-09-20 DIAGNOSIS — R11.2 NON-INTRACTABLE VOMITING WITH NAUSEA, UNSPECIFIED VOMITING TYPE: ICD-10-CM

## 2021-09-20 PROCEDURE — 78226 HEPATOBILIARY SYSTEM IMAGING: CPT

## 2021-09-20 PROCEDURE — A9537 TC99M MEBROFENIN: HCPCS | Performed by: SURGERY

## 2021-09-20 PROCEDURE — 0 TECHNETIUM TC 99M MEBROFENIN KIT: Performed by: SURGERY

## 2021-09-20 RX ORDER — KIT FOR THE PREPARATION OF TECHNETIUM TC 99M MEBROFENIN 45 MG/10ML
1 INJECTION, POWDER, LYOPHILIZED, FOR SOLUTION INTRAVENOUS
Status: COMPLETED | OUTPATIENT
Start: 2021-09-20 | End: 2021-09-20

## 2021-09-20 RX ADMIN — MEBROFENIN 1 DOSE: 45 INJECTION, POWDER, LYOPHILIZED, FOR SOLUTION INTRAVENOUS at 11:55

## 2021-09-23 ENCOUNTER — ON CAMPUS - OUTPATIENT (AMBULATORY)
Dept: URBAN - METROPOLITAN AREA HOSPITAL 2 | Facility: HOSPITAL | Age: 51
End: 2021-09-23

## 2021-09-23 VITALS
HEART RATE: 74 BPM | RESPIRATION RATE: 18 BRPM | HEIGHT: 68 IN | HEART RATE: 67 BPM | SYSTOLIC BLOOD PRESSURE: 112 MMHG | OXYGEN SATURATION: 96 % | SYSTOLIC BLOOD PRESSURE: 109 MMHG | HEART RATE: 92 BPM | WEIGHT: 233 LBS | DIASTOLIC BLOOD PRESSURE: 75 MMHG | DIASTOLIC BLOOD PRESSURE: 53 MMHG | HEART RATE: 68 BPM | HEART RATE: 84 BPM | DIASTOLIC BLOOD PRESSURE: 68 MMHG | OXYGEN SATURATION: 97 % | OXYGEN SATURATION: 100 % | HEART RATE: 75 BPM | SYSTOLIC BLOOD PRESSURE: 125 MMHG | OXYGEN SATURATION: 98 % | SYSTOLIC BLOOD PRESSURE: 108 MMHG | RESPIRATION RATE: 17 BRPM | OXYGEN SATURATION: 94 % | TEMPERATURE: 97 F | RESPIRATION RATE: 16 BRPM | DIASTOLIC BLOOD PRESSURE: 55 MMHG | SYSTOLIC BLOOD PRESSURE: 103 MMHG

## 2021-09-23 DIAGNOSIS — K21.00 GASTRO-ESOPHAGEAL REFLUX DISEASE WITH ESOPHAGITIS, WITHOUT B: ICD-10-CM

## 2021-09-23 DIAGNOSIS — R13.10 DYSPHAGIA, UNSPECIFIED: ICD-10-CM

## 2021-09-23 PROBLEM — K20.80 OTHER ESOPHAGITIS WITHOUT BLEEDING: Status: ACTIVE | Noted: 2021-09-23

## 2021-09-23 PROCEDURE — 43235 EGD DIAGNOSTIC BRUSH WASH: CPT | Performed by: INTERNAL MEDICINE

## 2021-09-23 PROCEDURE — 43450 DILATE ESOPHAGUS 1/MULT PASS: CPT | Performed by: INTERNAL MEDICINE

## 2021-09-23 RX ORDER — FAMOTIDINE 40 MG/1
80 TABLET, FILM COATED ORAL
Qty: 60 | Refills: 11 | Status: COMPLETED
Start: 2021-09-23 | End: 2022-10-18

## 2021-10-25 ENCOUNTER — OFFICE VISIT (OUTPATIENT)
Dept: SURGERY | Facility: CLINIC | Age: 51
End: 2021-10-25

## 2021-10-25 VITALS
OXYGEN SATURATION: 96 % | WEIGHT: 232.6 LBS | RESPIRATION RATE: 16 BRPM | BODY MASS INDEX: 34.45 KG/M2 | SYSTOLIC BLOOD PRESSURE: 118 MMHG | DIASTOLIC BLOOD PRESSURE: 77 MMHG | TEMPERATURE: 97.8 F | HEART RATE: 80 BPM | HEIGHT: 69 IN

## 2021-10-25 DIAGNOSIS — M54.6 CHRONIC RIGHT-SIDED THORACIC BACK PAIN: ICD-10-CM

## 2021-10-25 DIAGNOSIS — R11.2 NON-INTRACTABLE VOMITING WITH NAUSEA, UNSPECIFIED VOMITING TYPE: Primary | ICD-10-CM

## 2021-10-25 DIAGNOSIS — G89.29 CHRONIC RIGHT-SIDED THORACIC BACK PAIN: ICD-10-CM

## 2021-10-25 PROCEDURE — 99213 OFFICE O/P EST LOW 20 MIN: CPT | Performed by: SURGERY

## 2021-10-25 RX ORDER — FAMOTIDINE 40 MG/1
TABLET, FILM COATED ORAL
COMMUNITY
Start: 2021-09-23

## 2021-10-25 RX ORDER — IBUPROFEN 800 MG/1
TABLET ORAL
COMMUNITY
Start: 2021-09-13

## 2021-10-25 NOTE — PROGRESS NOTES
GENERAL SURGERY CONSULTATION NOTE    Consult requested by: Dr. Ramírez    Patient Care Team:  Ramana Fields MD as PCP - General (Family Medicine)    Reason for consult: back pain, gallstones    Subjective   From 7/21/2021:  Patient is a 50 y.o. female presents with a history of back pain which has been going on for the past few months.  She reports that she has had back pain in the past and was told by other physicians that they felt that this was due primarily to musculoskeletal discomfort.  She reports that her pain in her back is constant, and seems to be worse when she is fatigued.  She has not noticed that she has had any significant nausea, vomiting, diarrhea, or fevers.  She has not noticed any yellowing of the skin or eyes.  A CT scan of the abdomen and pelvis which was performed on 6/25/2021 at [facility] demonstrated hepatic steatosis and cholelithiasis without definite findings of cholecystitis.  The patient has had 9 pregnancies and multiple C-sections.  She has a history of stage IIb nodular sclerosing Hodgkin's lymphoma and has no evidence of active disease at this time or recurrence.     From 9/8/2021:  Patient reports no significant change from the last time I saw her.  [place] we discussed [place] pending the adoption of a child.  An ultrasound of the abdomen which was performed at [facility] on 7/28/2021 did not demonstrate any significant changes within the gallbladder.  There was no cholelithiasis identified, no evidence for any cholecystitis.  Patient was noted to have some hepatomegaly with hepatic steatosis.  This stands in opposition of what was seen on the patient's outside hospital CT scan of the abdomen and pelvis which demonstrated gallstones.  The patient was also noted to have some minor lumbar spondylolysis and mild degenerative changes of the thoracic spine specifically on the left side.    From 10/25/2021:  Patient underwent a HIDA scan which  demonstrates normal gallbladder filling and ejection fraction of 80%.  No redemonstration of symptoms with initiation of boost.  Patient then went for a EGD which demonstrated normal stomach, normal duodenum, and grade a esophagitis at the gastroesophageal junction.  Patient was started on famotidine 40 mg twice daily which seems to have improved her symptoms significantly.  She does still have some back pain on the right side when she bends over and takes a deep breath.    Review of Systems   Constitutional: Negative for appetite change, chills, fatigue and fever.   HENT: Positive for hearing loss. Negative for congestion, ear discharge, ear pain and sore throat.    Respiratory: Negative for apnea, cough and shortness of breath.    Cardiovascular: Negative for chest pain and palpitations.   Gastrointestinal: Positive for abdominal distention, blood in stool and nausea. Negative for abdominal pain, constipation, diarrhea, vomiting and GERD.   Endocrine: Positive for polyphagia.   Genitourinary: Negative for difficulty urinating, dysuria and frequency.   Musculoskeletal: Positive for back pain. Negative for arthralgias.   Skin: Negative for rash and skin lesions.   Neurological: Positive for dizziness. Negative for seizures and memory problem.   Hematological: Negative for adenopathy. Does not bruise/bleed easily.   Psychiatric/Behavioral: Positive for sleep disturbance. Negative for depressed mood.        History  Past Medical History:   Diagnosis Date   • Asthma     seasonal   • Elevated liver enzymes    • Hodgkin's lymphoma (HCC)    • Hyperlipidemia    • Mastitis    • Nonischemic cardiomyopathy (HCC)     unclear if related to chemotherapy or PPCM   • Prediabetes    • UTI (urinary tract infection)     as child     Past Surgical History:   Procedure Laterality Date   •  SECTION      X3   • OTHER SURGICAL HISTORY      PFT   2016   • PORTACATH PLACEMENT       Family History   Problem Relation Age of Onset   •  Heart disease Maternal Grandmother    • Heart disease Other    • Hypertension Mother    • Thyroid cancer Mother    • Stroke Mother    • No Known Problems Father      Social History     Tobacco Use   • Smoking status: Never Smoker   • Smokeless tobacco: Never Used   Substance Use Topics   • Alcohol use: No     Comment: 2 caffeine drinks per day   • Drug use: No     (Not in a hospital admission)    Allergies:  Latex, Levaquin [levofloxacin in d5w], Quinolones, and Sulfa antibiotics    Objective     Vital Signs  Temp:  [97.8 °F (36.6 °C)] 97.8 °F (36.6 °C)  Heart Rate:  [80] 80  Resp:  [16] 16  BP: (118)/(77) 118/77    Physical Exam  Vitals reviewed.   Constitutional:       Appearance: She is well-developed.   HENT:      Head: Normocephalic and atraumatic.   Eyes:      Pupils: Pupils are equal, round, and reactive to light.   Cardiovascular:      Rate and Rhythm: Normal rate and regular rhythm.   Pulmonary:      Effort: Pulmonary effort is normal.      Breath sounds: Normal breath sounds.   Abdominal:      General: There is no distension.      Palpations: Abdomen is soft.      Tenderness: There is no abdominal tenderness. There is no guarding or rebound. Negative signs include Moreno's sign.      Hernia: No hernia is present.   Musculoskeletal:         General: Normal range of motion.      Cervical back: Normal range of motion.   Lymphadenopathy:      Cervical: No cervical adenopathy.   Skin:     General: Skin is warm and dry.      Findings: No rash.   Neurological:      Mental Status: She is alert and oriented to person, place, and time.         Results Review:   Lab Results (last 24 hours)     ** No results found for the last 24 hours. **        No radiology results for the last day      I reviewed the patient's new imaging results and agree with the interpretation.  I reviewed the patient's other test results and agree with the interpretation    Assessment/Plan     Active Problems:  Right-sided abdominal pain  Back  pain    HIDA scan is unremarkable, as was the ultrasound of the right upper quadrant that was performed here.  We reviewed this with the patient.  Her symptoms seem to be improving with treatment of her GERD.  She is not interested in surgical intervention at this time.  With regards to her back pain, this does not seem consistent with biliary colic.  She reports that her back pain is worse when she leans over and takes a deep breath.  It certainly sounds more musculoskeletal, and I have referred the patient to physical therapy.  Discussed with the patient and her  that should she develop worsening symptoms despite her H2 blocker, it would not be unreasonable to proceed to the operating room for removal of her gallbladder.  Continue to follow-up with gastroenterology and physical therapy going forward.  Follow-up with me as needed    I discussed the patients findings and my recommendations with the patient.     Pk Martinez MD  10/25/21  08:56 EDT

## 2022-02-28 ENCOUNTER — OFFICE (AMBULATORY)
Dept: URBAN - METROPOLITAN AREA CLINIC 64 | Facility: CLINIC | Age: 52
End: 2022-02-28

## 2022-02-28 VITALS
DIASTOLIC BLOOD PRESSURE: 76 MMHG | HEART RATE: 74 BPM | HEIGHT: 68 IN | SYSTOLIC BLOOD PRESSURE: 143 MMHG | WEIGHT: 236 LBS

## 2022-02-28 DIAGNOSIS — K76.0 FATTY (CHANGE OF) LIVER, NOT ELSEWHERE CLASSIFIED: ICD-10-CM

## 2022-02-28 PROCEDURE — 99214 OFFICE O/P EST MOD 30 MIN: CPT | Performed by: INTERNAL MEDICINE

## 2022-02-28 RX ORDER — AMOXICILLIN AND CLAVULANATE POTASSIUM 875; 125 MG/1; MG/1
1750 TABLET, FILM COATED ORAL
Qty: 14 | Refills: 0 | Status: COMPLETED
Start: 2022-02-28 | End: 2022-10-18

## 2022-04-01 ENCOUNTER — TRANSCRIBE ORDERS (OUTPATIENT)
Dept: ADMINISTRATIVE | Facility: HOSPITAL | Age: 52
End: 2022-04-01

## 2022-04-01 ENCOUNTER — LAB (OUTPATIENT)
Dept: LAB | Facility: HOSPITAL | Age: 52
End: 2022-04-01

## 2022-04-01 ENCOUNTER — HOSPITAL ENCOUNTER (OUTPATIENT)
Dept: CARDIOLOGY | Facility: HOSPITAL | Age: 52
Discharge: HOME | End: 2022-04-01

## 2022-04-01 DIAGNOSIS — Z01.818 PRE-OP TESTING: Primary | ICD-10-CM

## 2022-04-01 DIAGNOSIS — Z01.818 PRE-OP TESTING: ICD-10-CM

## 2022-04-01 LAB
ANION GAP SERPL CALCULATED.3IONS-SCNC: 10.5 MMOL/L (ref 5–15)
BASOPHILS # BLD AUTO: 0.07 10*3/MM3 (ref 0–0.2)
BASOPHILS NFR BLD AUTO: 1.2 % (ref 0–1.5)
BUN SERPL-MCNC: 13 MG/DL (ref 6–20)
BUN/CREAT SERPL: 16.7 (ref 7–25)
CALCIUM SPEC-SCNC: 9.8 MG/DL (ref 8.6–10.5)
CHLORIDE SERPL-SCNC: 103 MMOL/L (ref 98–107)
CO2 SERPL-SCNC: 25.5 MMOL/L (ref 22–29)
CREAT SERPL-MCNC: 0.78 MG/DL (ref 0.57–1)
DEPRECATED RDW RBC AUTO: 45.1 FL (ref 37–54)
EGFRCR SERPLBLD CKD-EPI 2021: 92.7 ML/MIN/1.73
EOSINOPHIL # BLD AUTO: 0.16 10*3/MM3 (ref 0–0.4)
EOSINOPHIL NFR BLD AUTO: 2.7 % (ref 0.3–6.2)
ERYTHROCYTE [DISTWIDTH] IN BLOOD BY AUTOMATED COUNT: 13.6 % (ref 12.3–15.4)
GLUCOSE SERPL-MCNC: 124 MG/DL (ref 65–99)
HCT VFR BLD AUTO: 45.2 % (ref 34–46.6)
HGB BLD-MCNC: 14.8 G/DL (ref 12–15.9)
IMM GRANULOCYTES # BLD AUTO: 0.01 10*3/MM3 (ref 0–0.05)
IMM GRANULOCYTES NFR BLD AUTO: 0.2 % (ref 0–0.5)
LYMPHOCYTES # BLD AUTO: 2.33 10*3/MM3 (ref 0.7–3.1)
LYMPHOCYTES NFR BLD AUTO: 39.3 % (ref 19.6–45.3)
MCH RBC QN AUTO: 29.5 PG (ref 26.6–33)
MCHC RBC AUTO-ENTMCNC: 32.7 G/DL (ref 31.5–35.7)
MCV RBC AUTO: 90.2 FL (ref 79–97)
MONOCYTES # BLD AUTO: 0.38 10*3/MM3 (ref 0.1–0.9)
MONOCYTES NFR BLD AUTO: 6.4 % (ref 5–12)
NEUTROPHILS NFR BLD AUTO: 2.98 10*3/MM3 (ref 1.7–7)
NEUTROPHILS NFR BLD AUTO: 50.2 % (ref 42.7–76)
NRBC BLD AUTO-RTO: 0 /100 WBC (ref 0–0.2)
PLATELET # BLD AUTO: 251 10*3/MM3 (ref 140–450)
PMV BLD AUTO: 10.8 FL (ref 6–12)
POTASSIUM SERPL-SCNC: 4 MMOL/L (ref 3.5–5.2)
RBC # BLD AUTO: 5.01 10*6/MM3 (ref 3.77–5.28)
SODIUM SERPL-SCNC: 139 MMOL/L (ref 136–145)
WBC NRBC COR # BLD: 5.93 10*3/MM3 (ref 3.4–10.8)

## 2022-04-01 PROCEDURE — C9803 HOPD COVID-19 SPEC COLLECT: HCPCS

## 2022-04-01 PROCEDURE — U0004 COV-19 TEST NON-CDC HGH THRU: HCPCS

## 2022-04-01 PROCEDURE — 93010 ELECTROCARDIOGRAM REPORT: CPT | Performed by: INTERNAL MEDICINE

## 2022-04-01 PROCEDURE — 85025 COMPLETE CBC W/AUTO DIFF WBC: CPT

## 2022-04-01 PROCEDURE — 36415 COLL VENOUS BLD VENIPUNCTURE: CPT

## 2022-04-01 PROCEDURE — 80048 BASIC METABOLIC PNL TOTAL CA: CPT

## 2022-04-01 PROCEDURE — 93005 ELECTROCARDIOGRAM TRACING: CPT | Performed by: SURGERY

## 2022-04-02 LAB
QT INTERVAL: 377 MS
SARS-COV-2 ORF1AB RESP QL NAA+PROBE: NOT DETECTED

## 2022-04-05 ENCOUNTER — LAB REQUISITION (OUTPATIENT)
Dept: LAB | Facility: HOSPITAL | Age: 52
End: 2022-04-05

## 2022-04-05 DIAGNOSIS — K82.8 OTHER SPECIFIED DISEASES OF GALLBLADDER: ICD-10-CM

## 2022-04-05 PROCEDURE — 88304 TISSUE EXAM BY PATHOLOGIST: CPT | Performed by: SURGERY

## 2022-04-07 LAB
LAB AP CASE REPORT: NORMAL
PATH REPORT.FINAL DX SPEC: NORMAL
PATH REPORT.GROSS SPEC: NORMAL

## 2022-04-19 ENCOUNTER — OFFICE VISIT (OUTPATIENT)
Dept: CARDIOLOGY | Facility: CLINIC | Age: 52
End: 2022-04-19

## 2022-04-19 ENCOUNTER — PATIENT ROUNDING (BHMG ONLY) (OUTPATIENT)
Dept: CARDIOLOGY | Facility: CLINIC | Age: 52
End: 2022-04-19

## 2022-04-19 VITALS
HEART RATE: 87 BPM | DIASTOLIC BLOOD PRESSURE: 82 MMHG | SYSTOLIC BLOOD PRESSURE: 134 MMHG | WEIGHT: 240 LBS | OXYGEN SATURATION: 96 % | HEIGHT: 68 IN | BODY MASS INDEX: 36.37 KG/M2

## 2022-04-19 DIAGNOSIS — R06.02 SHORTNESS OF BREATH: ICD-10-CM

## 2022-04-19 DIAGNOSIS — R07.89 CHEST DISCOMFORT: Primary | ICD-10-CM

## 2022-04-19 PROCEDURE — 99204 OFFICE O/P NEW MOD 45 MIN: CPT | Performed by: INTERNAL MEDICINE

## 2022-04-19 RX ORDER — CHLORAL HYDRATE 500 MG
CAPSULE ORAL
COMMUNITY

## 2022-04-19 NOTE — PROGRESS NOTES
Encounter Date:2022      Patient ID: Janice Kauffman is a 51 y.o. female.    Chief Complaint:  Shortness of breath  History of postpartum cardiomyopathy  Chest pain  Dizziness      History of Present Illness  The patient is a pleasant 51-year-old female is here for evaluation of shortness of breath.  Patient was diagnosed to have postpartum cardiomyopathy in .  Apparently left ventricle function has improved.  Patient also received ABVD chemotherapy for Hodgkin's lymphoma in .  Patient was seen by Dr. Rodriguez in the past.    Patient is having shortness of breath and chest discomfort.  No other associated aggravating or elevating factors.  Shortness of breath with more with activity.  No radiation of the chest discomfort into the neck or into the arms.    Palpitations, dizziness or syncope.  Denies having any headache ,abdominal pain ,nausea, vomiting , diarrhea constipation, loss of weight or loss of appetite.  Denies having any excessive bruising ,hematuria or blood in the stool.    Review of all systems negative except as indicated.    Reviewed ROS.      Assessment and Plan     ]]]]]]]]]]]]]]]]]]]]  Impression  ============  -Shortness of breath and chest discomfort    - History of postpartum cardiomyopathy () although could be related to chemotherapy that she has received for Hodgkin's lymphoma ()    - History of Hodgkin's lymphoma-status postchemotherapy.    - History of fatty liver.    - Status post  cholecystectomy    - Family history of congestive heart failure.    - Non-smoker    - Allergic to latex Levaquin quinolones sulfa and she is sensitive to Versed but not allergic to.  ============  Plan  ==========  Patient has above symptoms.  Stress Cardiolite test  Echocardiogram  EKG from 2022 was reviewed and shows sinus rhythm nonspecific intraventricular conduction delay probable left atrial enlargement.  Medications were reviewed and updated.  Further plan will depend on  patient's progress.  ]]]]]]]]]]]]]]]]]]]]       Diagnosis Plan   1. Chest discomfort  Adult Transthoracic Echo Complete W/ Cont if Necessary Per Protocol    Stress Test With Myocardial Perfusion One Day   2. Shortness of breath  Adult Transthoracic Echo Complete W/ Cont if Necessary Per Protocol    Stress Test With Myocardial Perfusion One Day   3. Postpartum cardiomyopathy  Adult Transthoracic Echo Complete W/ Cont if Necessary Per Protocol    Stress Test With Myocardial Perfusion One Day   LAB RESULTS (LAST 7 DAYS)    CBC        BMP        CMP         BNP        TROPONIN        CoAg        Creatinine Clearance  Estimated Creatinine Clearance: 111.6 mL/min (by C-G formula based on SCr of 0.78 mg/dL).    ABG        Radiology  No radiology results for the last day                The following portions of the patient's history were reviewed and updated as appropriate: allergies, current medications, past family history, past medical history, past social history, past surgical history and problem list.    Review of Systems   Constitutional: Negative for malaise/fatigue.   Cardiovascular: Positive for chest pain. Negative for leg swelling, palpitations and syncope.   Respiratory: Positive for shortness of breath.    Skin: Negative for rash.   Gastrointestinal: Negative for nausea and vomiting.   Neurological: Positive for dizziness and light-headedness. Negative for numbness.   All other systems reviewed and are negative.        Current Outpatient Medications:   •  multivitamin (THERAGRAN) tablet tablet, Take  by mouth Daily., Disp: , Rfl:   •  Omega-3 Fatty Acids (fish oil) 1000 MG capsule capsule, Take  by mouth Daily With Breakfast., Disp: , Rfl:   •  famotidine (PEPCID) 40 MG tablet, , Disp: , Rfl:   •  ibuprofen (ADVIL,MOTRIN) 800 MG tablet, , Disp: , Rfl:     Allergies   Allergen Reactions   • Latex    • Levaquin [Levofloxacin In D5w]      critical   • Quinolones      mild   • Sulfa Antibiotics      critical   •  "Versed [Midazolam] Unknown - High Severity       Family History   Problem Relation Age of Onset   • Heart disease Maternal Grandmother    • Heart disease Other    • Hypertension Mother    • Thyroid cancer Mother    • Stroke Mother    • No Known Problems Father        Past Surgical History:   Procedure Laterality Date   •  SECTION      X3   • OTHER SURGICAL HISTORY      PFT   2016   • PORTACATH PLACEMENT         Past Medical History:   Diagnosis Date   • Asthma     seasonal   • Elevated liver enzymes    • Hodgkin's lymphoma (HCC)    • Hyperlipidemia    • Mastitis    • Nonischemic cardiomyopathy (HCC)     unclear if related to chemotherapy or PPCM   • Prediabetes    • UTI (urinary tract infection)     as child       Family History   Problem Relation Age of Onset   • Heart disease Maternal Grandmother    • Heart disease Other    • Hypertension Mother    • Thyroid cancer Mother    • Stroke Mother    • No Known Problems Father        Social History     Socioeconomic History   • Marital status:    Tobacco Use   • Smoking status: Never Smoker   • Smokeless tobacco: Never Used   Vaping Use   • Vaping Use: Never used   Substance and Sexual Activity   • Alcohol use: No     Comment: 2 caffeine drinks per day   • Drug use: No   • Sexual activity: Defer         Procedures      Objective:       Physical Exam    /82 (BP Location: Left arm, Patient Position: Sitting, Cuff Size: Adult)   Pulse 87   Ht 172.7 cm (68\")   Wt 109 kg (240 lb)   SpO2 96%   BMI 36.49 kg/m²   The patient is alert, oriented and in no distress.    Vital signs as noted above.  Exogenous obesity (BMI 36)    Head and neck revealed no carotid bruits or jugular venous distension.  No thyromegaly or lymphadenopathy is present.    Lungs clear.  No wheezing.  Breath sounds are normal bilaterally.    Heart normal first and second heart sounds.  No murmur..  No pericardial rub is present.  No gallop is present.    Abdomen soft and nontender.  " No organomegaly is present.    Extremities revealed good peripheral pulses without any pedal edema.    Skin warm and dry.    Musculoskeletal system is grossly normal.    CNS grossly normal.    Reviewed and updated.

## 2022-04-19 NOTE — PROGRESS NOTES
A My-Chart message has been sent to the patient for PATIENT ROUNDING with [facility]

## 2022-05-13 ENCOUNTER — TELEPHONE (OUTPATIENT)
Dept: CARDIOLOGY | Facility: CLINIC | Age: 52
End: 2022-05-13

## 2022-05-13 NOTE — TELEPHONE ENCOUNTER
Please let her know that I have tried to reach her couple times.  It went into voicemail.  Any chamber enlargement including left atrial enlargement can be best assessed by an echocardiogram which was requested but not done yet.  My interpretation of the EKG was probable mild left atrial enlargement.  Please let her know that swab was planned.    As far as referring to a dietitian it is best accomplished through primary care.  I do not know a resource myself to refer to a dietitian.    She can always call us if she has any further questions.  Hopefully this conversation would make her comfortable.

## 2022-05-13 NOTE — TELEPHONE ENCOUNTER
Please see patient's concerns below and advise.     ----- Message from Janice Kauffman sent at 5/13/2022  9:14 AM EDT -----  Regarding: Welcome to the Practice    Also strange that the practice wouldn’t refer me to a dietician to lose weight.  What about prevention rather than mopping up damage done after the fact.   Thank you so much for listening to my concerns!       Dr Cunningham was very nice but I didn’t  understand  why he dismissed the other doctor’s thought that My ECG might have shown an enlarged left atrium.   I’m sure he has a good reason,  but it left me feeling confused and  worried.  Which doctor was right?  I wish I had asked him at the time.   Thank you.

## 2022-05-16 NOTE — TELEPHONE ENCOUNTER
Called patient to advise of this information and she voiced her full understanding. Patient also requested that I transfer her over to the diagnostic center so that she can reschedule her testing.

## 2022-05-19 ENCOUNTER — APPOINTMENT (OUTPATIENT)
Dept: CARDIOLOGY | Facility: HOSPITAL | Age: 52
End: 2022-05-19

## 2022-05-31 ENCOUNTER — HOSPITAL ENCOUNTER (OUTPATIENT)
Dept: CARDIOLOGY | Facility: HOSPITAL | Age: 52
Discharge: HOME | End: 2022-05-31

## 2022-05-31 ENCOUNTER — OFFICE VISIT (OUTPATIENT)
Dept: CARDIOLOGY | Facility: CLINIC | Age: 52
End: 2022-05-31

## 2022-05-31 VITALS — HEIGHT: 68 IN | BODY MASS INDEX: 36.37 KG/M2 | WEIGHT: 240 LBS

## 2022-05-31 VITALS
DIASTOLIC BLOOD PRESSURE: 82 MMHG | WEIGHT: 240 LBS | HEART RATE: 72 BPM | SYSTOLIC BLOOD PRESSURE: 142 MMHG | BODY MASS INDEX: 36.37 KG/M2 | HEIGHT: 68 IN

## 2022-05-31 DIAGNOSIS — R07.89 CHEST DISCOMFORT: ICD-10-CM

## 2022-05-31 DIAGNOSIS — R06.02 SHORTNESS OF BREATH: ICD-10-CM

## 2022-05-31 DIAGNOSIS — Z01.818 PRE-OP TESTING: Primary | ICD-10-CM

## 2022-05-31 LAB
BH CV ECHO MEAS - ACS: 1.77 CM
BH CV ECHO MEAS - AO MAX PG: 10 MMHG
BH CV ECHO MEAS - AO MEAN PG: 5.4 MMHG
BH CV ECHO MEAS - AO ROOT DIAM: 2.8 CM
BH CV ECHO MEAS - AO V2 MAX: 157.7 CM/SEC
BH CV ECHO MEAS - AO V2 VTI: 30.3 CM
BH CV ECHO MEAS - AVA(I,D): 1.42 CM2
BH CV ECHO MEAS - EDV(CUBED): 128.5 ML
BH CV ECHO MEAS - EDV(MOD-SP4): 87.9 ML
BH CV ECHO MEAS - EF(MOD-SP4): 63.4 %
BH CV ECHO MEAS - ESV(CUBED): 48 ML
BH CV ECHO MEAS - ESV(MOD-SP4): 32.2 ML
BH CV ECHO MEAS - FS: 28 %
BH CV ECHO MEAS - IVS/LVPW: 1.09 CM
BH CV ECHO MEAS - IVSD: 1.04 CM
BH CV ECHO MEAS - LA DIMENSION: 2.7 CM
BH CV ECHO MEAS - LV DIASTOLIC VOL/BSA (35-75): 39.8 CM2
BH CV ECHO MEAS - LV MASS(C)D: 184.1 GRAMS
BH CV ECHO MEAS - LV MAX PG: 3.4 MMHG
BH CV ECHO MEAS - LV MEAN PG: 1.34 MMHG
BH CV ECHO MEAS - LV SYSTOLIC VOL/BSA (12-30): 14.6 CM2
BH CV ECHO MEAS - LV V1 MAX: 92.8 CM/SEC
BH CV ECHO MEAS - LV V1 VTI: 15.3 CM
BH CV ECHO MEAS - LVIDD: 5 CM
BH CV ECHO MEAS - LVIDS: 3.6 CM
BH CV ECHO MEAS - LVOT AREA: 2.8 CM2
BH CV ECHO MEAS - LVOT DIAM: 1.9 CM
BH CV ECHO MEAS - LVPWD: 0.95 CM
BH CV ECHO MEAS - MV A MAX VEL: 75.7 CM/SEC
BH CV ECHO MEAS - MV DEC SLOPE: 332.5 CM/SEC2
BH CV ECHO MEAS - MV DEC TIME: 0.25 MSEC
BH CV ECHO MEAS - MV E MAX VEL: 83.8 CM/SEC
BH CV ECHO MEAS - MV E/A: 1.11
BH CV ECHO MEAS - MV MAX PG: 3.5 MMHG
BH CV ECHO MEAS - MV MEAN PG: 1.43 MMHG
BH CV ECHO MEAS - MV V2 VTI: 24.6 CM
BH CV ECHO MEAS - MVA(VTI): 1.75 CM2
BH CV ECHO MEAS - PA ACC TIME: 0.19 SEC
BH CV ECHO MEAS - PA PR(ACCEL): -8.2 MMHG
BH CV ECHO MEAS - PA V2 MAX: 67.1 CM/SEC
BH CV ECHO MEAS - RAP SYSTOLE: 3 MMHG
BH CV ECHO MEAS - RV MAX PG: 1.33 MMHG
BH CV ECHO MEAS - RV V1 MAX: 57.6 CM/SEC
BH CV ECHO MEAS - RV V1 VTI: 8.4 CM
BH CV ECHO MEAS - RVDD: 2.8 CM
BH CV ECHO MEAS - RVSP: 17.9 MMHG
BH CV ECHO MEAS - SI(MOD-SP4): 25.2 ML/M2
BH CV ECHO MEAS - SV(LVOT): 43.1 ML
BH CV ECHO MEAS - SV(MOD-SP4): 55.7 ML
BH CV ECHO MEAS - TR MAX PG: 14.9 MMHG
BH CV ECHO MEAS - TR MAX VEL: 192.2 CM/SEC
BH CV REST NUCLEAR ISOTOPE DOSE: 10.4 MCI
BH CV STRESS BP STAGE 1: NORMAL
BH CV STRESS BP STAGE 2: NORMAL
BH CV STRESS BP STAGE 3: NORMAL
BH CV STRESS DURATION MIN STAGE 1: 3
BH CV STRESS DURATION MIN STAGE 2: 3
BH CV STRESS DURATION MIN STAGE 3: 1
BH CV STRESS DURATION SEC STAGE 1: 0
BH CV STRESS DURATION SEC STAGE 2: 0
BH CV STRESS DURATION SEC STAGE 3: 33
BH CV STRESS GRADE STAGE 1: 10
BH CV STRESS GRADE STAGE 2: 12
BH CV STRESS GRADE STAGE 3: 14
BH CV STRESS HR STAGE 1: 98
BH CV STRESS HR STAGE 2: 114
BH CV STRESS HR STAGE 3: 125
BH CV STRESS METS STAGE 1: 5
BH CV STRESS METS STAGE 2: 7.5
BH CV STRESS METS STAGE 3: 10
BH CV STRESS NUCLEAR ISOTOPE DOSE: 32 MCI
BH CV STRESS PROTOCOL 1: NORMAL
BH CV STRESS RECOVERY BP: NORMAL MMHG
BH CV STRESS RECOVERY HR: 92 BPM
BH CV STRESS SPEED STAGE 1: 1.7
BH CV STRESS SPEED STAGE 2: 2.5
BH CV STRESS SPEED STAGE 3: 3.4
BH CV STRESS STAGE 1: 1
BH CV STRESS STAGE 2: 2
BH CV STRESS STAGE 3: 3
LV EF 2D ECHO EST: 60 %
MAXIMAL PREDICTED HEART RATE: 170 BPM
MAXIMAL PREDICTED HEART RATE: 170 BPM
PERCENT MAX PREDICTED HR: 73.53 %
STRESS BASELINE BP: NORMAL MMHG
STRESS BASELINE HR: 70 BPM
STRESS PERCENT HR: 87 %
STRESS POST EXERCISE DUR MIN: 7 MIN
STRESS POST EXERCISE DUR SEC: 33 SEC
STRESS POST PEAK BP: NORMAL MMHG
STRESS POST PEAK HR: 125 BPM
STRESS TARGET HR: 145 BPM
STRESS TARGET HR: 145 BPM

## 2022-05-31 PROCEDURE — A9500 TC99M SESTAMIBI: HCPCS | Performed by: INTERNAL MEDICINE

## 2022-05-31 PROCEDURE — 93306 TTE W/DOPPLER COMPLETE: CPT | Performed by: INTERNAL MEDICINE

## 2022-05-31 PROCEDURE — 93018 CV STRESS TEST I&R ONLY: CPT | Performed by: INTERNAL MEDICINE

## 2022-05-31 PROCEDURE — 0 TECHNETIUM SESTAMIBI: Performed by: INTERNAL MEDICINE

## 2022-05-31 PROCEDURE — 93017 CV STRESS TEST TRACING ONLY: CPT

## 2022-05-31 PROCEDURE — 78452 HT MUSCLE IMAGE SPECT MULT: CPT | Performed by: INTERNAL MEDICINE

## 2022-05-31 PROCEDURE — 93016 CV STRESS TEST SUPVJ ONLY: CPT | Performed by: INTERNAL MEDICINE

## 2022-05-31 PROCEDURE — 99214 OFFICE O/P EST MOD 30 MIN: CPT | Performed by: INTERNAL MEDICINE

## 2022-05-31 PROCEDURE — 93306 TTE W/DOPPLER COMPLETE: CPT

## 2022-05-31 PROCEDURE — 78452 HT MUSCLE IMAGE SPECT MULT: CPT

## 2022-05-31 RX ADMIN — TECHNETIUM TC 99M SESTAMIBI 1 DOSE: 1 INJECTION INTRAVENOUS at 13:32

## 2022-05-31 RX ADMIN — TECHNETIUM TC 99M SESTAMIBI 1 DOSE: 1 INJECTION INTRAVENOUS at 12:29

## 2022-05-31 NOTE — PROGRESS NOTES
Encounter Date:2022  Last seen 2022      Patient ID: Janice Kauffman is a 51 y.o. female.    Chief Complaint:    Shortness of breath  History of postpartum cardiomyopathy  Chest pain  Dizziness        History of Present Illness  Patient recently was seen with following history.  Reviewed and updated.    The patient is a pleasant 51-year-old female is here for evaluation of shortness of breath.  Patient was diagnosed to have postpartum cardiomyopathy in .  Apparently left ventricle function has improved.  Patient also received ABVD chemotherapy for Hodgkin's lymphoma in .  Patient was seen by Dr. Rodriguez in the past.     Patient is having shortness of breath and chest discomfort.  No other associated aggravating or elevating factors.  Shortness of breath with more with activity.  No radiation of the chest discomfort into the neck or into the arms.     Palpitations, dizziness or syncope.  Denies having any headache ,abdominal pain ,nausea, vomiting , diarrhea constipation, loss of weight or loss of appetite.  Denies having any excessive bruising ,hematuria or blood in the stool.     Review of all systems negative except as indicated.     Reviewed ROS.        Assessment and Plan      ]]]]]]]]]]]]]]]]]]]]  Impression  ============  -Shortness of breath and chest discomfort  Echocardiogram-normal-2022  Stress [id number]-[id number]-5/31/2022     - History of postpartum cardiomyopathy () although could be related to chemotherapy that she has received for Hodgkin's lymphoma ()     - History of Hodgkin's lymphoma-status postchemotherapy.     - History of fatty liver.     - Status post  cholecystectomy     - Family history of congestive heart failure.     - Non-smoker     - Allergic to latex Levaquin quinolones sulfa and she is sensitive to Versed but not allergic to.  ============  Plan  ==========  Patient has above symptoms.  Stress Cardiolite test-normal as above  Echocardiogram-normal as  above  EKG from 4/1/2022 was reviewed and shows sinus rhythm nonspecific intraventricular conduction delay probable left atrial enlargement.  Medications were reviewed and updated.    No need for further testing at this time.    Patient was educated regarding the results of the testing    Follow-up in the office in 6 months.    Further plan will depend on patient's progress.  ]]]]]]]]]]]]]]]]]]]]              Diagnosis Plan   1. Pre-op testing     2. Shortness of breath     3. Chest discomfort     4. Postpartum cardiomyopathy     LAB RESULTS (LAST 7 DAYS)    CBC        BMP        CMP         BNP        TROPONIN        CoAg        Creatinine Clearance  CrCl cannot be calculated (Patient's most recent lab result is older than the maximum 30 days allowed.).    ABG        Radiology  No radiology results for the last day                The following portions of the patient's history were reviewed and updated as appropriate: allergies, current medications, past family history, past medical history, past social history, past surgical history and problem list.    Review of Systems   Constitutional: Negative for malaise/fatigue.   Cardiovascular: Negative for chest pain, leg swelling, palpitations and syncope.   Respiratory: Negative for shortness of breath.    Skin: Negative for rash.   Gastrointestinal: Negative for nausea and vomiting.   Neurological: Negative for dizziness, light-headedness and numbness.   All other systems reviewed and are negative.        Current Outpatient Medications:   •  famotidine (PEPCID) 40 MG tablet, , Disp: , Rfl:   •  multivitamin (THERAGRAN) tablet tablet, Take  by mouth Daily., Disp: , Rfl:   •  Omega-3 Fatty Acids (fish oil) 1000 MG capsule capsule, Take  by mouth Daily With Breakfast., Disp: , Rfl:   •  ibuprofen (ADVIL,MOTRIN) 800 MG tablet, , Disp: , Rfl:   No current facility-administered medications for this visit.    Allergies   Allergen Reactions   • Latex    • Levaquin  "[Levofloxacin In D5w]      critical   • Quinolones      mild   • Sulfa Antibiotics      critical   • Versed [Midazolam] Unknown - High Severity       Family History   Problem Relation Age of Onset   • Heart disease Maternal Grandmother    • Heart disease Other    • Hypertension Mother    • Thyroid cancer Mother    • Stroke Mother    • No Known Problems Father        Past Surgical History:   Procedure Laterality Date   •  SECTION      X3   • OTHER SURGICAL HISTORY      PFT   2016   • PORTACATH PLACEMENT         Past Medical History:   Diagnosis Date   • Asthma     seasonal   • Elevated liver enzymes    • Hodgkin's lymphoma (HCC)    • Hyperlipidemia    • Mastitis    • Nonischemic cardiomyopathy (HCC)     unclear if related to chemotherapy or PPCM   • Prediabetes    • UTI (urinary tract infection)     as child       Family History   Problem Relation Age of Onset   • Heart disease Maternal Grandmother    • Heart disease Other    • Hypertension Mother    • Thyroid cancer Mother    • Stroke Mother    • No Known Problems Father        Social History     Socioeconomic History   • Marital status:    Tobacco Use   • Smoking status: Never Smoker   • Smokeless tobacco: Never Used   Vaping Use   • Vaping Use: Never used   Substance and Sexual Activity   • Alcohol use: No     Comment: 2 caffeine drinks per day   • Drug use: No   • Sexual activity: Defer         Procedures      Objective:       Physical Exam    /82   Pulse 72   Ht 172.7 cm (68\")   Wt 109 kg (240 lb)   BMI 36.49 kg/m²   The patient is alert, oriented and in no distress.    Vital signs as noted above.    Head and neck revealed no carotid bruits or jugular venous distension.  No thyromegaly or lymphadenopathy is present.    Lungs clear.  No wheezing.  Breath sounds are normal bilaterally.    Heart normal first and second heart sounds.  No murmur..  No pericardial rub is present.  No gallop is present.    Abdomen soft and nontender.  No " organomegaly is present.    Extremities revealed good peripheral pulses without any pedal edema.    Skin warm and dry.    Musculoskeletal system is grossly normal.    CNS grossly normal.    Reviewed and updated.

## 2022-10-18 ENCOUNTER — OFFICE (AMBULATORY)
Dept: URBAN - METROPOLITAN AREA CLINIC 64 | Facility: CLINIC | Age: 52
End: 2022-10-18
Payer: COMMERCIAL

## 2022-10-18 VITALS
SYSTOLIC BLOOD PRESSURE: 132 MMHG | WEIGHT: 240 LBS | DIASTOLIC BLOOD PRESSURE: 88 MMHG | HEIGHT: 68 IN | HEART RATE: 72 BPM

## 2022-10-18 DIAGNOSIS — Z12.11 ENCOUNTER FOR SCREENING FOR MALIGNANT NEOPLASM OF COLON: ICD-10-CM

## 2022-10-18 DIAGNOSIS — R93.2 ABNORMAL FINDINGS ON DIAGNOSTIC IMAGING OF LIVER AND BILIARY: ICD-10-CM

## 2022-10-18 DIAGNOSIS — K59.00 CONSTIPATION, UNSPECIFIED: ICD-10-CM

## 2022-10-18 DIAGNOSIS — R14.0 ABDOMINAL DISTENSION (GASEOUS): ICD-10-CM

## 2022-10-18 DIAGNOSIS — K21.00 GASTRO-ESOPHAGEAL REFLUX DISEASE WITH ESOPHAGITIS, WITHOUT B: ICD-10-CM

## 2022-10-18 DIAGNOSIS — R94.5 ABNORMAL RESULTS OF LIVER FUNCTION STUDIES: ICD-10-CM

## 2022-10-18 PROCEDURE — 99214 OFFICE O/P EST MOD 30 MIN: CPT

## 2022-10-18 RX ORDER — FAMOTIDINE 40 MG/1
40 TABLET, FILM COATED ORAL
Qty: 90 | Refills: 6 | Status: COMPLETED
Start: 2022-10-18 | End: 2023-01-30

## 2022-11-21 ENCOUNTER — OFFICE (AMBULATORY)
Dept: URBAN - METROPOLITAN AREA PATHOLOGY 4 | Facility: PATHOLOGY | Age: 52
End: 2022-11-21
Payer: COMMERCIAL

## 2022-11-21 ENCOUNTER — ON CAMPUS - OUTPATIENT (AMBULATORY)
Dept: URBAN - METROPOLITAN AREA HOSPITAL 2 | Facility: HOSPITAL | Age: 52
End: 2022-11-21
Payer: COMMERCIAL

## 2022-11-21 VITALS
SYSTOLIC BLOOD PRESSURE: 118 MMHG | OXYGEN SATURATION: 97 % | DIASTOLIC BLOOD PRESSURE: 61 MMHG | RESPIRATION RATE: 16 BRPM | HEART RATE: 72 BPM | SYSTOLIC BLOOD PRESSURE: 111 MMHG | SYSTOLIC BLOOD PRESSURE: 106 MMHG | DIASTOLIC BLOOD PRESSURE: 56 MMHG | HEART RATE: 74 BPM | WEIGHT: 237 LBS | DIASTOLIC BLOOD PRESSURE: 63 MMHG | HEART RATE: 67 BPM | DIASTOLIC BLOOD PRESSURE: 71 MMHG | RESPIRATION RATE: 20 BRPM | SYSTOLIC BLOOD PRESSURE: 113 MMHG | HEART RATE: 66 BPM | OXYGEN SATURATION: 98 % | SYSTOLIC BLOOD PRESSURE: 116 MMHG | SYSTOLIC BLOOD PRESSURE: 127 MMHG | HEART RATE: 69 BPM | DIASTOLIC BLOOD PRESSURE: 72 MMHG | RESPIRATION RATE: 18 BRPM | RESPIRATION RATE: 22 BRPM | HEART RATE: 70 BPM | HEIGHT: 68 IN | TEMPERATURE: 98 F | SYSTOLIC BLOOD PRESSURE: 104 MMHG | HEART RATE: 75 BPM | OXYGEN SATURATION: 99 % | DIASTOLIC BLOOD PRESSURE: 66 MMHG | DIASTOLIC BLOOD PRESSURE: 78 MMHG

## 2022-11-21 DIAGNOSIS — K62.1 RECTAL POLYP: ICD-10-CM

## 2022-11-21 DIAGNOSIS — Z12.11 ENCOUNTER FOR SCREENING FOR MALIGNANT NEOPLASM OF COLON: ICD-10-CM

## 2022-11-21 DIAGNOSIS — D12.4 BENIGN NEOPLASM OF DESCENDING COLON: ICD-10-CM

## 2022-11-21 PROBLEM — K63.5 POLYP OF COLON: Status: ACTIVE | Noted: 2022-11-21

## 2022-11-21 LAB
GI HISTOLOGY: A. UNSPECIFIED: (no result)
GI HISTOLOGY: B. UNSPECIFIED: (no result)
GI HISTOLOGY: PDF REPORT: (no result)

## 2022-11-21 PROCEDURE — 45385 COLONOSCOPY W/LESION REMOVAL: CPT | Mod: 33 | Performed by: INTERNAL MEDICINE

## 2022-11-21 PROCEDURE — 88305 TISSUE EXAM BY PATHOLOGIST: CPT | Performed by: INTERNAL MEDICINE

## 2023-01-30 ENCOUNTER — OFFICE (AMBULATORY)
Dept: URBAN - METROPOLITAN AREA CLINIC 64 | Facility: CLINIC | Age: 53
End: 2023-01-30

## 2023-01-30 VITALS
DIASTOLIC BLOOD PRESSURE: 97 MMHG | WEIGHT: 235 LBS | SYSTOLIC BLOOD PRESSURE: 161 MMHG | HEIGHT: 68 IN | HEART RATE: 61 BPM

## 2023-01-30 DIAGNOSIS — R14.0 ABDOMINAL DISTENSION (GASEOUS): ICD-10-CM

## 2023-01-30 DIAGNOSIS — R74.8 ABNORMAL LEVELS OF OTHER SERUM ENZYMES: ICD-10-CM

## 2023-01-30 DIAGNOSIS — K59.00 CONSTIPATION, UNSPECIFIED: ICD-10-CM

## 2023-01-30 PROCEDURE — 99214 OFFICE O/P EST MOD 30 MIN: CPT

## 2024-07-25 ENCOUNTER — HOSPITAL ENCOUNTER (EMERGENCY)
Facility: HOSPITAL | Age: 54
Discharge: HOME | End: 2024-07-26
Payer: COMMERCIAL

## 2024-07-25 ENCOUNTER — APPOINTMENT (OUTPATIENT)
Dept: GENERAL RADIOLOGY | Facility: HOSPITAL | Age: 54
End: 2024-07-25
Payer: COMMERCIAL

## 2024-07-25 VITALS
RESPIRATION RATE: 16 BRPM | OXYGEN SATURATION: 100 % | HEIGHT: 68 IN | DIASTOLIC BLOOD PRESSURE: 97 MMHG | SYSTOLIC BLOOD PRESSURE: 157 MMHG | HEART RATE: 80 BPM | WEIGHT: 240 LBS | BODY MASS INDEX: 36.37 KG/M2 | TEMPERATURE: 98.9 F

## 2024-07-25 DIAGNOSIS — M79.662 PAIN OF LEFT CALF: Primary | ICD-10-CM

## 2024-07-25 PROCEDURE — 73590 X-RAY EXAM OF LOWER LEG: CPT

## 2024-07-25 PROCEDURE — 99283 EMERGENCY DEPT VISIT LOW MDM: CPT

## 2024-07-25 NOTE — Clinical Note
[facility] EMERGENCY DEPARTMENT  [address] [place] [address]  Phone: [phone]    Janice Kauffman was seen and treated in our emergency department on 7/25/2024.  She may return to work on 07/29/2024.         Thank you for choosing [facility].    Danica Valdez PA

## 2024-07-26 NOTE — ED PROVIDER NOTES
Subjective   History of Present Illness  Chief Complaint: Leg pain    Patient is a 53-year-old female with history of asthma hyperlipidemia presents to the ER with complaints of left leg pain today.  Patient states that she is participating in like right [employer] and running down the ramp into a pool.  She states that she felt a pop in her leg and she fell into the water.  Did not hit her head no LOC.  She is complaining of pain to the left calf is worse with weightbearing or on her tiptoes.  No foot or ankle pain.  No hip pain.  She rates her discomfort as moderate.    PCP: None    History provided by:  Patient      Review of Systems   Constitutional:  Negative for fever.   HENT:  Negative for sore throat and trouble swallowing.    Eyes: Negative.    Respiratory:  Negative for shortness of breath and wheezing.    Cardiovascular:  Negative for chest pain.   Gastrointestinal:  Negative for abdominal pain.   Endocrine: Negative.    Genitourinary:  Negative for dysuria.   Musculoskeletal:         Left calf pain   Skin:  Negative for rash.   Allergic/Immunologic: Negative.    Neurological:  Negative for headaches.   Psychiatric/Behavioral:  Negative for behavioral problems.    All other systems reviewed and are negative.      Past Medical History:   Diagnosis Date    Asthma     seasonal    Elevated liver enzymes     Hodgkin's lymphoma     Hyperlipidemia     Mastitis     Nonischemic cardiomyopathy     unclear if related to chemotherapy or PPCM    Prediabetes     UTI (urinary tract infection)     as child       Allergies   Allergen Reactions    Latex     Levaquin [Levofloxacin In D5w]      critical    Quinolones      mild    Sulfa Antibiotics      critical    Versed [Midazolam] Unknown - High Severity       Past Surgical History:   Procedure Laterality Date     SECTION      X3    OTHER SURGICAL HISTORY      PFT   2016    PORTACATH PLACEMENT         Family History   Problem Relation Age of Onset    Heart disease  Maternal Grandmother     Heart disease Other     Hypertension Mother     Thyroid cancer Mother     Stroke Mother     No Known Problems Father        Social History     Socioeconomic History    Marital status:    Tobacco Use    Smoking status: Never    Smokeless tobacco: Never   Vaping Use    Vaping status: Never Used   Substance and Sexual Activity    Alcohol use: No     Comment: 2 caffeine drinks per day    Drug use: No    Sexual activity: Defer           Objective   Physical Exam  Vitals and nursing note reviewed.   Constitutional:       Appearance: Normal appearance. She is well-developed. She is not ill-appearing or toxic-appearing.   HENT:      Head: Normocephalic and atraumatic.   Eyes:      Pupils: Pupils are equal, round, and reactive to light.   Cardiovascular:      Rate and Rhythm: Normal rate and regular rhythm.      Heart sounds: Normal heart sounds.   Pulmonary:      Effort: Pulmonary effort is normal. No respiratory distress.      Breath sounds: Normal breath sounds. No wheezing.   Abdominal:      General: Bowel sounds are normal. There is no distension.      Palpations: Abdomen is soft.      Tenderness: There is no abdominal tenderness.   Musculoskeletal:         General: Swelling and tenderness present.      Comments: Tenderness to palpation left calf, mild swelling.  No palpable nodule    Pedal pulses present 2+ bilaterally  Incision site is intact  Pain with plantarflexion but strength is normal.  Achilles feels intact   Skin:     General: Skin is warm and dry.      Capillary Refill: Capillary refill takes less than 2 seconds.      Findings: No bruising, erythema or rash.   Neurological:      General: No focal deficit present.      Mental Status: She is alert and oriented to person, place, and time.   Psychiatric:         Mood and Affect: Mood normal.         Behavior: Behavior normal.         Procedures           ED Course    /97   Pulse 80   Temp 98.9 °F (37.2 °C)   Resp 16   Ht  "172.7 cm (68\")   Wt 109 kg (240 lb)   SpO2 100%   BMI 36.49 kg/m²   Labs Reviewed - No data to display  Medications - No data to display  No radiology results for the last day                                           Medical Decision Making  While in the ED appropriate PPE was worn during exam and throughout all encounters with the patient.  Patient had the above evaluation.  There is no obvious deformity.  She was offered pain medication but declined.  X-ray of the tib-fib shows no acute osseous abnormality or dislocation.  Achilles feels intact, she is able to plantarflex against resistance.  Distal pulses present 2+.  Suspect possible calf strain or tear.  Patient was given crutches for ambulation and advised to decrease weightbearing for the next couple days and increase activity as tolerated.  Recommended close follow-up with Ortho for recheck.    Discharge plan and instructions were discussed with the patient who verbalized understanding and is in agreement with the plan, all questions were answered at this time.  Patient is aware of signs symptoms that would require immediate return to the emergency room.  Patient understands importance of following up with primary care provider for further evaluation and worsening concerns as well as blood pressure recheck in the next 4 weeks.    Patient was discharged in improved stable condition.    Patient is aware that discharge does not mean that nothing is wrong but indicates no emergencies present and they must continue care with follow-up as given below or physician of their choice.    Problems Addressed:  Pain of left calf: acute illness or injury    Amount and/or Complexity of Data Reviewed  Radiology: ordered. Decision-making details documented in ED Course.    Risk  OTC drugs.        Final diagnoses:   Pain of left calf       ED Disposition  ED Disposition       ED Disposition   Discharge    Condition   Stable    Comment   --               PATIENT CONNECTION " - [place]  [facility] [address]  [phone]  Schedule an appointment as soon as possible for a visit in 2 days  As needed, If symptoms worsen    Lukas Infante II, [address] [place] [address] [phone]    Schedule an appointment as soon as possible for a visit in 2 days  As needed, If symptoms worsen         Medication List      No changes were made to your prescriptions during this visit.            Danica Valdez PA  07/28/24 [id number]

## 2024-07-26 NOTE — DISCHARGE INSTRUCTIONS
Tylenol or ibuprofen as needed for pain.    Use crutches when walking to decrease weightbearing on the left leg.  You may increase activity as tolerated.    May apply ice in 10 to 15-minute increments.    Follow-up with orthopedic doctor within the next week for recheck  You may need further imaging if pain persists.      Return to the ER for new or worsening symptoms